# Patient Record
Sex: FEMALE | Race: OTHER | NOT HISPANIC OR LATINO
[De-identification: names, ages, dates, MRNs, and addresses within clinical notes are randomized per-mention and may not be internally consistent; named-entity substitution may affect disease eponyms.]

---

## 2017-05-19 ENCOUNTER — RESULT REVIEW (OUTPATIENT)
Age: 47
End: 2017-05-19

## 2017-07-31 ENCOUNTER — APPOINTMENT (OUTPATIENT)
Dept: COLORECTAL SURGERY | Facility: CLINIC | Age: 47
End: 2017-07-31
Payer: COMMERCIAL

## 2017-07-31 PROCEDURE — 45384 COLONOSCOPY W/LESION REMOVAL: CPT

## 2017-08-02 LAB — CORE LAB BIOPSY: NORMAL

## 2017-11-18 ENCOUNTER — APPOINTMENT (OUTPATIENT)
Dept: HUMAN REPRODUCTION | Facility: CLINIC | Age: 47
End: 2017-11-18

## 2017-12-26 ENCOUNTER — APPOINTMENT (OUTPATIENT)
Dept: INTERNAL MEDICINE | Facility: CLINIC | Age: 47
End: 2017-12-26

## 2017-12-27 ENCOUNTER — APPOINTMENT (OUTPATIENT)
Dept: INTERNAL MEDICINE | Facility: CLINIC | Age: 47
End: 2017-12-27

## 2018-01-18 ENCOUNTER — OTHER (OUTPATIENT)
Age: 48
End: 2018-01-18

## 2018-01-24 ENCOUNTER — APPOINTMENT (OUTPATIENT)
Dept: INTERNAL MEDICINE | Facility: CLINIC | Age: 48
End: 2018-01-24
Payer: COMMERCIAL

## 2018-01-24 ENCOUNTER — APPOINTMENT (OUTPATIENT)
Dept: INTERNAL MEDICINE | Facility: CLINIC | Age: 48
End: 2018-01-24

## 2018-01-24 VITALS
DIASTOLIC BLOOD PRESSURE: 90 MMHG | BODY MASS INDEX: 24.63 KG/M2 | HEART RATE: 82 BPM | SYSTOLIC BLOOD PRESSURE: 140 MMHG | WEIGHT: 139 LBS | OXYGEN SATURATION: 99 % | HEIGHT: 63 IN

## 2018-01-24 DIAGNOSIS — Z87.19 PERSONAL HISTORY OF OTHER DISEASES OF THE DIGESTIVE SYSTEM: ICD-10-CM

## 2018-01-24 DIAGNOSIS — Z87.448 PERSONAL HISTORY OF OTHER DISEASES OF URINARY SYSTEM: ICD-10-CM

## 2018-01-24 PROCEDURE — 99396 PREV VISIT EST AGE 40-64: CPT

## 2018-01-25 LAB
APPEARANCE: CLEAR
BILIRUBIN URINE: NEGATIVE
BLOOD URINE: NEGATIVE
COLOR: YELLOW
GLUCOSE QUALITATIVE U: NEGATIVE MG/DL
KETONES URINE: NEGATIVE
LEUKOCYTE ESTERASE URINE: NEGATIVE
NITRITE URINE: NEGATIVE
PH URINE: 6
PROTEIN URINE: NEGATIVE MG/DL
SPECIFIC GRAVITY URINE: 1.01
UROBILINOGEN URINE: NEGATIVE MG/DL

## 2018-01-31 LAB
ALBUMIN SERPL ELPH-MCNC: 4.4 G/DL
ALP BLD-CCNC: 61 U/L
ALT SERPL-CCNC: 19 U/L
ANION GAP SERPL CALC-SCNC: 16 MMOL/L
AST SERPL-CCNC: 21 U/L
BASOPHILS # BLD AUTO: 0.03 K/UL
BASOPHILS NFR BLD AUTO: 0.3 %
BILIRUB SERPL-MCNC: 0.4 MG/DL
BUN SERPL-MCNC: 11 MG/DL
CALCIUM SERPL-MCNC: 10.1 MG/DL
CHLORIDE SERPL-SCNC: 103 MMOL/L
CHOLEST SERPL-MCNC: 327 MG/DL
CHOLEST/HDLC SERPL: 5.6 RATIO
CO2 SERPL-SCNC: 22 MMOL/L
CREAT SERPL-MCNC: 0.69 MG/DL
EOSINOPHIL # BLD AUTO: 0.2 K/UL
EOSINOPHIL NFR BLD AUTO: 2.3 %
GLUCOSE SERPL-MCNC: 97 MG/DL
HBA1C MFR BLD HPLC: 5.5 %
HCT VFR BLD CALC: 43.7 %
HDLC SERPL-MCNC: 58 MG/DL
HGB BLD-MCNC: 14.4 G/DL
IMM GRANULOCYTES NFR BLD AUTO: 0.3 %
LDLC SERPL CALC-MCNC: 226 MG/DL
LYMPHOCYTES # BLD AUTO: 2.18 K/UL
LYMPHOCYTES NFR BLD AUTO: 25.2 %
MAN DIFF?: NORMAL
MCHC RBC-ENTMCNC: 30.8 PG
MCHC RBC-ENTMCNC: 33 GM/DL
MCV RBC AUTO: 93.4 FL
MONOCYTES # BLD AUTO: 0.76 K/UL
MONOCYTES NFR BLD AUTO: 8.8 %
NEUTROPHILS # BLD AUTO: 5.44 K/UL
NEUTROPHILS NFR BLD AUTO: 63.1 %
PLATELET # BLD AUTO: 391 K/UL
POTASSIUM SERPL-SCNC: 4.6 MMOL/L
PROT SERPL-MCNC: 7.1 G/DL
RBC # BLD: 4.68 M/UL
RBC # FLD: 13.1 %
SODIUM SERPL-SCNC: 141 MMOL/L
TRIGL SERPL-MCNC: 213 MG/DL
TSH SERPL-ACNC: 2.6 UIU/ML
WBC # FLD AUTO: 8.64 K/UL

## 2018-02-05 LAB
CHOLEST SERPL-MCNC: 316 MG/DL
CHOLEST/HDLC SERPL: 5.7 RATIO
HDLC SERPL-MCNC: 55 MG/DL
LDLC SERPL CALC-MCNC: 223 MG/DL
TRIGL SERPL-MCNC: 190 MG/DL

## 2018-07-19 ENCOUNTER — MEDICATION RENEWAL (OUTPATIENT)
Age: 48
End: 2018-07-19

## 2018-07-19 RX ORDER — ATORVASTATIN CALCIUM 20 MG/1
20 TABLET, FILM COATED ORAL
Qty: 30 | Refills: 3 | Status: DISCONTINUED | COMMUNITY
Start: 2018-02-05 | End: 2018-07-19

## 2019-03-28 ENCOUNTER — APPOINTMENT (OUTPATIENT)
Dept: PLASTIC SURGERY | Facility: CLINIC | Age: 49
End: 2019-03-28

## 2019-04-09 ENCOUNTER — APPOINTMENT (OUTPATIENT)
Dept: INTERNAL MEDICINE | Facility: CLINIC | Age: 49
End: 2019-04-09
Payer: COMMERCIAL

## 2019-04-09 VITALS
DIASTOLIC BLOOD PRESSURE: 72 MMHG | OXYGEN SATURATION: 98 % | WEIGHT: 127 LBS | TEMPERATURE: 98 F | BODY MASS INDEX: 22.5 KG/M2 | HEART RATE: 66 BPM | HEIGHT: 63 IN | SYSTOLIC BLOOD PRESSURE: 120 MMHG

## 2019-04-09 DIAGNOSIS — Z87.19 PERSONAL HISTORY OF OTHER DISEASES OF THE DIGESTIVE SYSTEM: ICD-10-CM

## 2019-04-09 DIAGNOSIS — R39.9 UNSPECIFIED SYMPTOMS AND SIGNS INVOLVING THE GENITOURINARY SYSTEM: ICD-10-CM

## 2019-04-09 PROCEDURE — G0442 ANNUAL ALCOHOL SCREEN 15 MIN: CPT

## 2019-04-09 PROCEDURE — 99396 PREV VISIT EST AGE 40-64: CPT

## 2019-04-09 PROCEDURE — G0444 DEPRESSION SCREEN ANNUAL: CPT

## 2019-04-09 RX ORDER — CIPROFLOXACIN HYDROCHLORIDE 500 MG/1
500 TABLET, FILM COATED ORAL
Qty: 6 | Refills: 0 | Status: DISCONTINUED | COMMUNITY
Start: 2018-08-14 | End: 2019-04-09

## 2019-04-09 NOTE — ASSESSMENT
[FreeTextEntry1] : \par 49 year old woman with hx HLD presents for CPE.\par \par 1. HCM: Vaccines - up to date with flu shot; follows with GYN for PAP, mammo; breast exam benign in office today; praised pt's tobacco-free lifestyle; check labs today\par \par 2. HLD: not consistent with statin medication due to side effects; referral given to see Dr. Simon for other options; check lipid panel today, fasting for 6 hrs\par \par Depression screen performed today, PHQ2=0\par \par Annual alcohol misuse screen, 15 mins, done; negative

## 2019-04-09 NOTE — HISTORY OF PRESENT ILLNESS
[Health Maintenance] : health maintenance [___ Year(s) Ago] : [unfilled] year(s) ago [Spouse] : spouse [] :  [Occupation ___] : occupation: [unfilled] [Working Full Time] : working full time [Never Smoked Cigarettes] : has never smoked cigarettes [Occasional Use] : occasional alcohol use [None] : The patient has no concerns about alcohol abuse [Never] : has never used illicit drugs [Reg. Dental Visits] : She has regular dental visits [Good] : good [Healthy Diet] : She consumes a diverse and healthy diet [Regular Exercise] : She exercises regularly [Reviewed and Updated] : risk screening reviewed and updated [Up to Date] : up to date [Vision Problems] : She denies vision problems [Hearing Loss] : She denies hearing loss [Weight Concerns] : She does not have any weight concerns [Tobacco Use] : She does not use tobacco [Alcohol Use] : She denies alcohol use [Drug Abuse] : She denies drug use [de-identified] : \par Not consistently taking statin for elevated LDL. Had bad side effects with myalgias and fatigue.

## 2019-04-09 NOTE — PHYSICAL EXAM
[Sclera] : the sclera and conjunctiva were normal [General Appearance - In No Acute Distress] : in no acute distress [General Appearance - Alert] : alert [PERRL With Normal Accommodation] : pupils were equal in size, round, and reactive to light [Extraocular Movements] : extraocular movements were intact [Outer Ear] : the ears and nose were normal in appearance [Oropharynx] : the oropharynx was normal [Neck Cervical Mass (___cm)] : no neck mass was observed [Neck Appearance] : the appearance of the neck was normal [Thyroid Diffuse Enlargement] : the thyroid was not enlarged [Jugular Venous Distention Increased] : there was no jugular-venous distention [Thyroid Nodule] : there were no palpable thyroid nodules [Auscultation Breath Sounds / Voice Sounds] : lungs were clear to auscultation bilaterally [Heart Sounds Gallop] : no gallops [Heart Rate And Rhythm] : heart rate was normal and rhythm regular [Heart Sounds] : normal S1 and S2 [Heart Sounds Pericardial Friction Rub] : no pericardial rub [Murmurs] : no murmurs [Full Pulse] : the pedal pulses are present [Edema] : there was no peripheral edema [Breast Appearance] : normal in appearance [Breast Palpation Mass] : no palpable masses [Breast Abnormal Lactation (Galactorrhea)] : no nipple discharge [Bowel Sounds] : normal bowel sounds [Abdomen Soft] : soft [Abdomen Mass (___ Cm)] : no abdominal mass palpated [Abdomen Tenderness] : non-tender [Cervical Lymph Nodes Enlarged Anterior Bilaterally] : anterior cervical [Cervical Lymph Nodes Enlarged Posterior Bilaterally] : posterior cervical [Axillary Lymph Nodes Enlarged Bilaterally] : axillary [Supraclavicular Lymph Nodes Enlarged Bilaterally] : supraclavicular [No CVA Tenderness] : no ~M costovertebral angle tenderness [No Spinal Tenderness] : no spinal tenderness [Abnormal Walk] : normal gait [Nail Clubbing] : no clubbing  or cyanosis of the fingernails [Musculoskeletal - Swelling] : no joint swelling seen [Motor Tone] : muscle strength and tone were normal [] : no rash [Sensation] : the sensory exam was normal to light touch and pinprick [Motor Exam] : the motor exam was normal [No Focal Deficits] : no focal deficits [Affect] : the affect was normal [Impaired Insight] : insight and judgment were intact [Oriented To Time, Place, And Person] : oriented to person, place, and time

## 2019-04-11 LAB
ALBUMIN SERPL ELPH-MCNC: 4.6 G/DL
ALP BLD-CCNC: 61 U/L
ALT SERPL-CCNC: 17 U/L
ANION GAP SERPL CALC-SCNC: 11 MMOL/L
AST SERPL-CCNC: 15 U/L
BASOPHILS # BLD AUTO: 0.04 K/UL
BASOPHILS NFR BLD AUTO: 0.4 %
BILIRUB SERPL-MCNC: 0.3 MG/DL
BUN SERPL-MCNC: 16 MG/DL
CALCIUM SERPL-MCNC: 9.9 MG/DL
CHLORIDE SERPL-SCNC: 104 MMOL/L
CHOLEST SERPL-MCNC: 274 MG/DL
CHOLEST/HDLC SERPL: 5 RATIO
CO2 SERPL-SCNC: 25 MMOL/L
CREAT SERPL-MCNC: 0.64 MG/DL
EOSINOPHIL # BLD AUTO: 0.46 K/UL
EOSINOPHIL NFR BLD AUTO: 4.9 %
ESTIMATED AVERAGE GLUCOSE: 120 MG/DL
GLUCOSE SERPL-MCNC: 95 MG/DL
HBA1C MFR BLD HPLC: 5.8 %
HCT VFR BLD CALC: 43.2 %
HDLC SERPL-MCNC: 55 MG/DL
HGB BLD-MCNC: 13.8 G/DL
HIV1+2 AB SPEC QL IA.RAPID: NONREACTIVE
IMM GRANULOCYTES NFR BLD AUTO: 0.3 %
LDLC SERPL CALC-MCNC: 191 MG/DL
LYMPHOCYTES # BLD AUTO: 2.86 K/UL
LYMPHOCYTES NFR BLD AUTO: 30.4 %
MAN DIFF?: NORMAL
MCHC RBC-ENTMCNC: 29.4 PG
MCHC RBC-ENTMCNC: 31.9 GM/DL
MCV RBC AUTO: 91.9 FL
MONOCYTES # BLD AUTO: 0.75 K/UL
MONOCYTES NFR BLD AUTO: 8 %
NEUTROPHILS # BLD AUTO: 5.28 K/UL
NEUTROPHILS NFR BLD AUTO: 56 %
PLATELET # BLD AUTO: 334 K/UL
POTASSIUM SERPL-SCNC: 4.5 MMOL/L
PROT SERPL-MCNC: 6.7 G/DL
RBC # BLD: 4.7 M/UL
RBC # FLD: 12.4 %
SODIUM SERPL-SCNC: 140 MMOL/L
TRIGL SERPL-MCNC: 138 MG/DL
TSH SERPL-ACNC: 1.94 UIU/ML
WBC # FLD AUTO: 9.42 K/UL

## 2019-05-29 ENCOUNTER — APPOINTMENT (OUTPATIENT)
Dept: PLASTIC SURGERY | Facility: CLINIC | Age: 49
End: 2019-05-29
Payer: COMMERCIAL

## 2019-05-29 ENCOUNTER — LABORATORY RESULT (OUTPATIENT)
Age: 49
End: 2019-05-29

## 2019-05-29 VITALS
HEART RATE: 80 BPM | RESPIRATION RATE: 18 BRPM | WEIGHT: 127 LBS | TEMPERATURE: 98.1 F | HEIGHT: 63 IN | BODY MASS INDEX: 22.5 KG/M2 | OXYGEN SATURATION: 96 % | SYSTOLIC BLOOD PRESSURE: 148 MMHG | DIASTOLIC BLOOD PRESSURE: 85 MMHG

## 2019-05-29 PROCEDURE — 99199 UNLISTED SPECIAL SVC PX/RPRT: CPT | Mod: NC

## 2019-05-29 NOTE — HISTORY OF PRESENT ILLNESS
[FreeTextEntry1] : This is a prosper 50 yo F who presents to the office for an initial consultation regarding a left upper arm skin lesion. She had the lesion shave biopsied by her Dermatologist which showed melanocytic nevus. She was recommended to have the lesion fully excised. She is here today to discuss treatment options. \par

## 2019-05-29 NOTE — PROCEDURE
[Nl] : None [FreeTextEntry1] : Left upper arm skin lesion [FreeTextEntry2] : Excisional biopsy left upper arm lesion 2cm, and closure [FreeTextEntry6] : After the area was prepped and draped, the area was infiltrated with 1% lidocaine with epi. The previously biopsied area was marked with a 2mm margin around it. Incision was made around and carried down to subcutaneous fat. The specimen was marked with a short stitch superior and long lateral. The specimen was removed in one piecemeal and sent to pathology. It measured 2cm.\par \par The wound was then closed in layers with 4-0 monocryl for the dermis and 4-0 monocyl subcuticular for the skin. Patient tolerated well the procedure, there were no complications.  [FreeTextEntry7] : Left upper arm skin lesion

## 2019-05-29 NOTE — PHYSICAL EXAM
[de-identified] : The patient is ambulatory, well groomed, with no signs of cachexia. [de-identified] : Normocephalic, atraumatic [de-identified] : No conjuctival erythema, hyperemia, or discharge; No ectropion, entropion, lagophthalmos, or lid malposition\par Both pupils are equal, round, & reactive to light  [de-identified] : There are no masses, scars, or lesions of the external ear.  The external nose is midline with no scars or masses.  \par Gross hearing is intact bilaterally (finger rub).\par The nasal mucosa has no edema, lesions, or signs of desiccation. \par The lips and gums have no masses, lesions, friability or edema.

## 2019-05-29 NOTE — DATA REVIEWED
[FreeTextEntry1] : 04/11/19 Left Tricep:\par Melanocytic nevus, junctional type... the melanocytic proliferation is somewhat asymmetric and I would suggest that the skin be re-excised in order to be certain that the entire lesion has been removed and to prevent recurrence.\par

## 2019-08-22 ENCOUNTER — TRANSCRIPTION ENCOUNTER (OUTPATIENT)
Age: 49
End: 2019-08-22

## 2020-05-04 RX ORDER — HYDROXYCHLOROQUINE SULFATE 200 MG/1
200 TABLET, FILM COATED ORAL
Qty: 20 | Refills: 0 | Status: COMPLETED | COMMUNITY
Start: 2020-03-16 | End: 2020-05-14

## 2020-05-04 RX ORDER — AZITHROMYCIN 250 MG/1
250 TABLET, FILM COATED ORAL
Qty: 1 | Refills: 0 | Status: COMPLETED | COMMUNITY
Start: 2020-05-04 | End: 2020-05-09

## 2020-08-27 ENCOUNTER — APPOINTMENT (OUTPATIENT)
Dept: INTERNAL MEDICINE | Facility: CLINIC | Age: 50
End: 2020-08-27
Payer: COMMERCIAL

## 2020-08-27 VITALS
DIASTOLIC BLOOD PRESSURE: 89 MMHG | HEIGHT: 63 IN | WEIGHT: 130 LBS | TEMPERATURE: 98.6 F | HEART RATE: 77 BPM | SYSTOLIC BLOOD PRESSURE: 151 MMHG | BODY MASS INDEX: 23.04 KG/M2 | OXYGEN SATURATION: 98 %

## 2020-08-27 PROCEDURE — 99396 PREV VISIT EST AGE 40-64: CPT

## 2020-08-27 NOTE — ASSESSMENT
[FreeTextEntry1] : \par 50 year old woman with hx HLD presents for CPE.\par \par 1. HCM: Vaccines - up to date with flu shot; follows with GYN for PAP, mammo; breast exam benign in office today; praised pt's tobacco-free lifestyle; check labs today\par \par 2. HLD: not consistent with statin medication due to side effects; referral given to see Dr. Simon for other options; check lipid panel today, fasting for 6 hrs\par \par Depression screen performed today, PHQ2=0\par \par Annual alcohol misuse screen, 15 mins, done; negative

## 2020-08-27 NOTE — PHYSICAL EXAM
[General Appearance - Alert] : alert [General Appearance - In No Acute Distress] : in no acute distress [Sclera] : the sclera and conjunctiva were normal [Extraocular Movements] : extraocular movements were intact [PERRL With Normal Accommodation] : pupils were equal in size, round, and reactive to light [Outer Ear] : the ears and nose were normal in appearance [Neck Appearance] : the appearance of the neck was normal [Oropharynx] : the oropharynx was normal [Neck Cervical Mass (___cm)] : no neck mass was observed [Jugular Venous Distention Increased] : there was no jugular-venous distention [Thyroid Diffuse Enlargement] : the thyroid was not enlarged [Auscultation Breath Sounds / Voice Sounds] : lungs were clear to auscultation bilaterally [Thyroid Nodule] : there were no palpable thyroid nodules [Heart Rate And Rhythm] : heart rate was normal and rhythm regular [Heart Sounds Gallop] : no gallops [Murmurs] : no murmurs [Heart Sounds] : normal S1 and S2 [Heart Sounds Pericardial Friction Rub] : no pericardial rub [Full Pulse] : the pedal pulses are present [Edema] : there was no peripheral edema [Breast Palpation Mass] : no palpable masses [Breast Appearance] : normal in appearance [Breast Abnormal Lactation (Galactorrhea)] : no nipple discharge [Abdomen Soft] : soft [Bowel Sounds] : normal bowel sounds [Abdomen Tenderness] : non-tender [Abdomen Mass (___ Cm)] : no abdominal mass palpated [Cervical Lymph Nodes Enlarged Anterior Bilaterally] : anterior cervical [Supraclavicular Lymph Nodes Enlarged Bilaterally] : supraclavicular [Cervical Lymph Nodes Enlarged Posterior Bilaterally] : posterior cervical [No Spinal Tenderness] : no spinal tenderness [Axillary Lymph Nodes Enlarged Bilaterally] : axillary [No CVA Tenderness] : no ~M costovertebral angle tenderness [Abnormal Walk] : normal gait [Nail Clubbing] : no clubbing  or cyanosis of the fingernails [] : no rash [Musculoskeletal - Swelling] : no joint swelling seen [Motor Tone] : muscle strength and tone were normal [Sensation] : the sensory exam was normal to light touch and pinprick [Motor Exam] : the motor exam was normal [No Focal Deficits] : no focal deficits [Oriented To Time, Place, And Person] : oriented to person, place, and time [Affect] : the affect was normal [Impaired Insight] : insight and judgment were intact

## 2020-08-27 NOTE — HISTORY OF PRESENT ILLNESS
[___ Year(s) Ago] : [unfilled] year(s) ago [Health Maintenance] : health maintenance [] :  [Spouse] : spouse [Working Full Time] : working full time [Occupation ___] : occupation: [unfilled] [Never Smoked Cigarettes] : has never smoked cigarettes [Occasional Use] : occasional alcohol use [Good] : good [Never] : has never used illicit drugs [None] : The patient has no concerns about alcohol abuse [Reg. Dental Visits] : She has regular dental visits [Vision Problems] : She denies vision problems [Healthy Diet] : She consumes a diverse and healthy diet [Hearing Loss] : She denies hearing loss [Regular Exercise] : She exercises regularly [Weight Concerns] : She does not have any weight concerns [Tobacco Use] : She does not use tobacco [Alcohol Use] : She denies alcohol use [Drug Abuse] : She denies drug use [Up to Date] : up to date [Reviewed and Updated] : risk screening reviewed and updated [de-identified] : \par Not consistently taking statin for elevated LDL. Had bad side effects with myalgias and fatigue.

## 2020-09-04 LAB
25(OH)D3 SERPL-MCNC: 46.3 NG/ML
ALBUMIN SERPL ELPH-MCNC: 4.7 G/DL
ALP BLD-CCNC: 67 U/L
ALT SERPL-CCNC: 13 U/L
ANION GAP SERPL CALC-SCNC: 14 MMOL/L
AST SERPL-CCNC: 16 U/L
BASOPHILS # BLD AUTO: 0.04 K/UL
BASOPHILS NFR BLD AUTO: 0.5 %
BILIRUB SERPL-MCNC: 0.3 MG/DL
BUN SERPL-MCNC: 13 MG/DL
CALCIUM SERPL-MCNC: 9.6 MG/DL
CHLORIDE SERPL-SCNC: 105 MMOL/L
CHOLEST SERPL-MCNC: 271 MG/DL
CHOLEST/HDLC SERPL: 5 RATIO
CO2 SERPL-SCNC: 21 MMOL/L
CREAT SERPL-MCNC: 0.64 MG/DL
EOSINOPHIL # BLD AUTO: 0.24 K/UL
EOSINOPHIL NFR BLD AUTO: 2.9 %
ESTIMATED AVERAGE GLUCOSE: 114 MG/DL
GLUCOSE SERPL-MCNC: 82 MG/DL
HBA1C MFR BLD HPLC: 5.6 %
HCT VFR BLD CALC: 42.6 %
HDLC SERPL-MCNC: 54 MG/DL
HGB BLD-MCNC: 13.4 G/DL
HIV1+2 AB SPEC QL IA.RAPID: NONREACTIVE
IMM GRANULOCYTES NFR BLD AUTO: 0.4 %
LDLC SERPL CALC-MCNC: 173 MG/DL
LYMPHOCYTES # BLD AUTO: 2.42 K/UL
LYMPHOCYTES NFR BLD AUTO: 29.4 %
MAN DIFF?: NORMAL
MCHC RBC-ENTMCNC: 29.2 PG
MCHC RBC-ENTMCNC: 31.5 GM/DL
MCV RBC AUTO: 92.8 FL
MONOCYTES # BLD AUTO: 0.73 K/UL
MONOCYTES NFR BLD AUTO: 8.9 %
NEUTROPHILS # BLD AUTO: 4.78 K/UL
NEUTROPHILS NFR BLD AUTO: 57.9 %
PLATELET # BLD AUTO: 313 K/UL
POTASSIUM SERPL-SCNC: 4.3 MMOL/L
PROT SERPL-MCNC: 6.7 G/DL
RBC # BLD: 4.59 M/UL
RBC # FLD: 12.7 %
SARS-COV-2 IGG SERPL IA-ACNC: <0.1 INDEX
SARS-COV-2 IGG SERPL QL IA: NEGATIVE
SODIUM SERPL-SCNC: 140 MMOL/L
TRIGL SERPL-MCNC: 220 MG/DL
TSH SERPL-ACNC: 1.93 UIU/ML
WBC # FLD AUTO: 8.24 K/UL

## 2021-08-31 ENCOUNTER — APPOINTMENT (OUTPATIENT)
Dept: INTERNAL MEDICINE | Facility: CLINIC | Age: 51
End: 2021-08-31

## 2021-09-22 ENCOUNTER — APPOINTMENT (OUTPATIENT)
Dept: INTERNAL MEDICINE | Facility: CLINIC | Age: 51
End: 2021-09-22
Payer: COMMERCIAL

## 2021-09-22 ENCOUNTER — LABORATORY RESULT (OUTPATIENT)
Age: 51
End: 2021-09-22

## 2021-09-22 VITALS
SYSTOLIC BLOOD PRESSURE: 136 MMHG | TEMPERATURE: 97.4 F | HEIGHT: 62 IN | WEIGHT: 130 LBS | BODY MASS INDEX: 23.92 KG/M2 | OXYGEN SATURATION: 98 % | DIASTOLIC BLOOD PRESSURE: 81 MMHG | HEART RATE: 76 BPM

## 2021-09-22 PROCEDURE — 99396 PREV VISIT EST AGE 40-64: CPT

## 2021-09-22 PROCEDURE — 99072 ADDL SUPL MATRL&STAF TM PHE: CPT

## 2021-09-22 NOTE — PHYSICAL EXAM
[General Appearance - Alert] : alert [General Appearance - In No Acute Distress] : in no acute distress [Sclera] : the sclera and conjunctiva were normal [PERRL With Normal Accommodation] : pupils were equal in size, round, and reactive to light [Extraocular Movements] : extraocular movements were intact [Outer Ear] : the ears and nose were normal in appearance [Oropharynx] : the oropharynx was normal [Neck Appearance] : the appearance of the neck was normal [Neck Cervical Mass (___cm)] : no neck mass was observed [Jugular Venous Distention Increased] : there was no jugular-venous distention [Thyroid Diffuse Enlargement] : the thyroid was not enlarged [Thyroid Nodule] : there were no palpable thyroid nodules [Auscultation Breath Sounds / Voice Sounds] : lungs were clear to auscultation bilaterally [Heart Rate And Rhythm] : heart rate was normal and rhythm regular [Heart Sounds] : normal S1 and S2 [Heart Sounds Gallop] : no gallops [Murmurs] : no murmurs [Heart Sounds Pericardial Friction Rub] : no pericardial rub [Full Pulse] : the pedal pulses are present [Edema] : there was no peripheral edema [Breast Appearance] : normal in appearance [Breast Palpation Mass] : no palpable masses [Breast Abnormal Lactation (Galactorrhea)] : no nipple discharge [Bowel Sounds] : normal bowel sounds [Abdomen Soft] : soft [Abdomen Tenderness] : non-tender [Abdomen Mass (___ Cm)] : no abdominal mass palpated [Cervical Lymph Nodes Enlarged Anterior Bilaterally] : anterior cervical [Cervical Lymph Nodes Enlarged Posterior Bilaterally] : posterior cervical [Supraclavicular Lymph Nodes Enlarged Bilaterally] : supraclavicular [No CVA Tenderness] : no ~M costovertebral angle tenderness [Axillary Lymph Nodes Enlarged Bilaterally] : axillary [No Spinal Tenderness] : no spinal tenderness [Abnormal Walk] : normal gait [Nail Clubbing] : no clubbing  or cyanosis of the fingernails [Musculoskeletal - Swelling] : no joint swelling seen [Motor Tone] : muscle strength and tone were normal [] : no rash [Sensation] : the sensory exam was normal to light touch and pinprick [Motor Exam] : the motor exam was normal [No Focal Deficits] : no focal deficits [Oriented To Time, Place, And Person] : oriented to person, place, and time [Impaired Insight] : insight and judgment were intact [Affect] : the affect was normal

## 2021-09-22 NOTE — ASSESSMENT
[FreeTextEntry1] : \par 51 year old woman with hx HLD presents for CPE.\par \par 1. HCM: Vaccines - up to date with flu shot; follows with GYN for PAP, mammo; breast exam benign in office today; praised pt's tobacco-free lifestyle; check labs today\par \par Depression screen performed today, PHQ2=0\par \par Annual alcohol misuse screen, 15 mins, done; negative

## 2021-09-22 NOTE — HISTORY OF PRESENT ILLNESS
[Health Maintenance] : health maintenance [___ Year(s) Ago] : [unfilled] year(s) ago [Spouse] : spouse [] :  [Working Full Time] : working full time [Occupation ___] : occupation: [unfilled] [Never Smoked Cigarettes] : has never smoked cigarettes [Occasional Use] : occasional alcohol use [None] : The patient has no concerns about alcohol abuse [Never] : has never used illicit drugs [Good] : good [Reg. Dental Visits] : She has regular dental visits [Vision Problems] : She denies vision problems [Hearing Loss] : She denies hearing loss [Healthy Diet] : She consumes a diverse and healthy diet [Weight Concerns] : She does not have any weight concerns [Regular Exercise] : She exercises regularly [Tobacco Use] : She does not use tobacco [Alcohol Use] : She denies alcohol use [Drug Abuse] : She denies drug use [Reviewed and Updated] : risk screening reviewed and updated [Up to Date] : up to date [de-identified] : \par Not consistently taking statin for elevated LDL. Had bad side effects with myalgias and fatigue.

## 2021-09-27 ENCOUNTER — APPOINTMENT (OUTPATIENT)
Dept: COLORECTAL SURGERY | Facility: CLINIC | Age: 51
End: 2021-09-27
Payer: COMMERCIAL

## 2021-09-27 ENCOUNTER — LABORATORY RESULT (OUTPATIENT)
Age: 51
End: 2021-09-27

## 2021-09-27 DIAGNOSIS — K63.5 POLYP OF COLON: ICD-10-CM

## 2021-09-27 LAB
25(OH)D3 SERPL-MCNC: 38.8 NG/ML
ALBUMIN SERPL ELPH-MCNC: 4.6 G/DL
ALP BLD-CCNC: 68 U/L
ALT SERPL-CCNC: 17 U/L
ANION GAP SERPL CALC-SCNC: 14 MMOL/L
AST SERPL-CCNC: 16 U/L
B BURGDOR IGG+IGM SER QL IB: NORMAL
BASOPHILS # BLD AUTO: 0.04 K/UL
BASOPHILS NFR BLD AUTO: 0.5 %
BILIRUB SERPL-MCNC: 0.4 MG/DL
BUN SERPL-MCNC: 17 MG/DL
CALCIUM SERPL-MCNC: 10 MG/DL
CHLORIDE SERPL-SCNC: 102 MMOL/L
CHOLEST SERPL-MCNC: 283 MG/DL
CO2 SERPL-SCNC: 24 MMOL/L
COVID-19 NUCLEOCAPSID  GAM ANTIBODY INTERPRETATION: NEGATIVE
COVID-19 SPIKE DOMAIN ANTIBODY INTERPRETATION: POSITIVE
CREAT SERPL-MCNC: 0.64 MG/DL
EOSINOPHIL # BLD AUTO: 0.16 K/UL
EOSINOPHIL NFR BLD AUTO: 2 %
ESTIMATED AVERAGE GLUCOSE: 117 MG/DL
GLUCOSE SERPL-MCNC: 95 MG/DL
HBA1C MFR BLD HPLC: 5.7 %
HCT VFR BLD CALC: 44 %
HDLC SERPL-MCNC: 65 MG/DL
HGB BLD-MCNC: 14 G/DL
HIV1+2 AB SPEC QL IA.RAPID: NONREACTIVE
IMM GRANULOCYTES NFR BLD AUTO: 0.4 %
LDLC SERPL CALC-MCNC: 197 MG/DL
LYMPHOCYTES # BLD AUTO: 2.86 K/UL
LYMPHOCYTES NFR BLD AUTO: 36.2 %
MAN DIFF?: NORMAL
MCHC RBC-ENTMCNC: 29.4 PG
MCHC RBC-ENTMCNC: 31.8 GM/DL
MCV RBC AUTO: 92.4 FL
MONOCYTES # BLD AUTO: 0.55 K/UL
MONOCYTES NFR BLD AUTO: 7 %
NEUTROPHILS # BLD AUTO: 4.25 K/UL
NEUTROPHILS NFR BLD AUTO: 53.9 %
NONHDLC SERPL-MCNC: 219 MG/DL
PLATELET # BLD AUTO: 316 K/UL
POTASSIUM SERPL-SCNC: 4.6 MMOL/L
PROT SERPL-MCNC: 6.9 G/DL
RBC # BLD: 4.76 M/UL
RBC # FLD: 13.1 %
SARS-COV-2 AB SERPL IA-ACNC: >250 U/ML
SARS-COV-2 AB SERPL QL IA: 0.08 INDEX
SODIUM SERPL-SCNC: 140 MMOL/L
TRIGL SERPL-MCNC: 106 MG/DL
TSH SERPL-ACNC: 1.92 UIU/ML
WBC # FLD AUTO: 7.89 K/UL

## 2021-09-27 PROCEDURE — 45384 COLONOSCOPY W/LESION REMOVAL: CPT

## 2021-09-27 PROCEDURE — 99072 ADDL SUPL MATRL&STAF TM PHE: CPT

## 2021-11-29 ENCOUNTER — RX RENEWAL (OUTPATIENT)
Age: 51
End: 2021-11-29

## 2022-09-28 ENCOUNTER — LABORATORY RESULT (OUTPATIENT)
Age: 52
End: 2022-09-28

## 2022-09-28 ENCOUNTER — APPOINTMENT (OUTPATIENT)
Dept: INTERNAL MEDICINE | Facility: CLINIC | Age: 52
End: 2022-09-28

## 2022-09-28 ENCOUNTER — NON-APPOINTMENT (OUTPATIENT)
Age: 52
End: 2022-09-28

## 2022-09-28 VITALS
TEMPERATURE: 98.3 F | SYSTOLIC BLOOD PRESSURE: 135 MMHG | BODY MASS INDEX: 24.04 KG/M2 | OXYGEN SATURATION: 98 % | HEIGHT: 62.6 IN | DIASTOLIC BLOOD PRESSURE: 77 MMHG | WEIGHT: 134 LBS | HEART RATE: 68 BPM

## 2022-09-28 DIAGNOSIS — Z00.00 ENCOUNTER FOR GENERAL ADULT MEDICAL EXAMINATION W/OUT ABNORMAL FINDINGS: ICD-10-CM

## 2022-09-28 DIAGNOSIS — Z13.39 ENCOUNTER FOR SCREENING EXAM FOR OTHER MENTAL HEALTH AND BEHAVIORAL DISORDERS: ICD-10-CM

## 2022-09-28 DIAGNOSIS — Z13.31 ENCOUNTER FOR SCREENING FOR DEPRESSION: ICD-10-CM

## 2022-09-28 PROCEDURE — 99396 PREV VISIT EST AGE 40-64: CPT

## 2022-09-28 PROCEDURE — 99072 ADDL SUPL MATRL&STAF TM PHE: CPT

## 2022-09-28 PROCEDURE — G0444 DEPRESSION SCREEN ANNUAL: CPT | Mod: 59

## 2022-09-28 PROCEDURE — G0442 ANNUAL ALCOHOL SCREEN 15 MIN: CPT

## 2022-09-28 NOTE — HEALTH RISK ASSESSMENT
[No] : No [1 or 2 (0 pts)] : 1 or 2 (0 points) [Never (0 pts)] : Never (0 points) [0] : 2) Feeling down, depressed, or hopeless: Not at all (0) [PHQ-2 Negative - No further assessment needed] : PHQ-2 Negative - No further assessment needed [Audit-CScore] : 0 [[id number]] : 0

## 2022-09-28 NOTE — ASSESSMENT
[FreeTextEntry1] : \par 52 year old woman with hx HLD presents for CPE.\par \par 1. HCM: Vaccines - up to date with flu shot; follows with GYN for PAP, mammo; breast exam benign in office today; praised pt's tobacco-free lifestyle; check labs today\par \par Depression screen performed today, PHQ2=0\par \par Annual alcohol misuse screen, 15 mins, done; negative

## 2022-09-28 NOTE — PHYSICAL EXAM
[General Appearance - Alert] : alert [General Appearance - In No Acute Distress] : in no acute distress [Sclera] : the sclera and conjunctiva were normal [PERRL With Normal Accommodation] : pupils were equal in size, round, and reactive to light [Extraocular Movements] : extraocular movements were intact [Outer Ear] : the ears and nose were normal in appearance [Oropharynx] : the oropharynx was normal [Neck Appearance] : the appearance of the neck was normal [Neck Cervical Mass (___cm)] : no neck mass was observed [Jugular Venous Distention Increased] : there was no jugular-venous distention [Thyroid Diffuse Enlargement] : the thyroid was not enlarged [Thyroid Nodule] : there were no palpable thyroid nodules [Auscultation Breath Sounds / Voice Sounds] : lungs were clear to auscultation bilaterally [Heart Rate And Rhythm] : heart rate was normal and rhythm regular [Heart Sounds] : normal S1 and S2 [Heart Sounds Gallop] : no gallops [Murmurs] : no murmurs [Heart Sounds Pericardial Friction Rub] : no pericardial rub [Full Pulse] : the pedal pulses are present [Edema] : there was no peripheral edema [Breast Appearance] : normal in appearance [Breast Palpation Mass] : no palpable masses [Breast Abnormal Lactation (Galactorrhea)] : no nipple discharge [Bowel Sounds] : normal bowel sounds [Abdomen Soft] : soft [Abdomen Tenderness] : non-tender [Abdomen Mass (___ Cm)] : no abdominal mass palpated [Cervical Lymph Nodes Enlarged Posterior Bilaterally] : posterior cervical [Cervical Lymph Nodes Enlarged Anterior Bilaterally] : anterior cervical [Supraclavicular Lymph Nodes Enlarged Bilaterally] : supraclavicular [Axillary Lymph Nodes Enlarged Bilaterally] : axillary [No CVA Tenderness] : no ~M costovertebral angle tenderness [No Spinal Tenderness] : no spinal tenderness [Abnormal Walk] : normal gait [Nail Clubbing] : no clubbing  or cyanosis of the fingernails [Musculoskeletal - Swelling] : no joint swelling seen [Motor Tone] : muscle strength and tone were normal [] : no rash [Sensation] : the sensory exam was normal to light touch and pinprick [Motor Exam] : the motor exam was normal [No Focal Deficits] : no focal deficits [Oriented To Time, Place, And Person] : oriented to person, place, and time [Impaired Insight] : insight and judgment were intact [Affect] : the affect was normal

## 2022-09-28 NOTE — HISTORY OF PRESENT ILLNESS
[Health Maintenance] : health maintenance [___ Year(s) Ago] : [unfilled] year(s) ago [Spouse] : spouse [] :  [Working Full Time] : working full time [Occupation ___] : occupation: [unfilled] [Never Smoked Cigarettes] : has never smoked cigarettes [Occasional Use] : occasional alcohol use [None] : The patient has no concerns about alcohol abuse [Never] : has never used illicit drugs [Good] : good [Reg. Dental Visits] : She has regular dental visits [Vision Problems] : She denies vision problems [Hearing Loss] : She denies hearing loss [Healthy Diet] : She consumes a diverse and healthy diet [Weight Concerns] : She does not have any weight concerns [Regular Exercise] : She exercises regularly [Tobacco Use] : She does not use tobacco [Alcohol Use] : She denies alcohol use [Drug Abuse] : She denies drug use [Reviewed and Updated] : risk screening reviewed and updated [Up to Date] : up to date [de-identified] : \par Not consistently taking statin for elevated LDL. Had bad side effects with myalgias and fatigue.

## 2022-09-30 LAB
ALBUMIN SERPL ELPH-MCNC: 4.8 G/DL
ALP BLD-CCNC: 68 U/L
ALT SERPL-CCNC: 13 U/L
ANION GAP SERPL CALC-SCNC: 15 MMOL/L
AST SERPL-CCNC: 13 U/L
BASOPHILS # BLD AUTO: 0.04 K/UL
BASOPHILS NFR BLD AUTO: 0.4 %
BILIRUB SERPL-MCNC: 0.4 MG/DL
BUN SERPL-MCNC: 19 MG/DL
CALCIUM SERPL-MCNC: 10 MG/DL
CHLORIDE SERPL-SCNC: 102 MMOL/L
CHOLEST SERPL-MCNC: 286 MG/DL
CO2 SERPL-SCNC: 24 MMOL/L
CREAT SERPL-MCNC: 0.67 MG/DL
EGFR: 104 ML/MIN/1.73M2
EOSINOPHIL # BLD AUTO: 0.19 K/UL
EOSINOPHIL NFR BLD AUTO: 2 %
ESTIMATED AVERAGE GLUCOSE: 114 MG/DL
GLUCOSE SERPL-MCNC: 94 MG/DL
HBA1C MFR BLD HPLC: 5.6 %
HCT VFR BLD CALC: 43.7 %
HDLC SERPL-MCNC: 60 MG/DL
HGB BLD-MCNC: 13.9 G/DL
HIV1+2 AB SPEC QL IA.RAPID: NONREACTIVE
IMM GRANULOCYTES NFR BLD AUTO: 0.3 %
LDLC SERPL CALC-MCNC: 202 MG/DL
LYMPHOCYTES # BLD AUTO: 3 K/UL
LYMPHOCYTES NFR BLD AUTO: 31.7 %
MAN DIFF?: NORMAL
MCHC RBC-ENTMCNC: 29.8 PG
MCHC RBC-ENTMCNC: 31.8 GM/DL
MCV RBC AUTO: 93.6 FL
MONOCYTES # BLD AUTO: 0.76 K/UL
MONOCYTES NFR BLD AUTO: 8 %
NEUTROPHILS # BLD AUTO: 5.43 K/UL
NEUTROPHILS NFR BLD AUTO: 57.6 %
NONHDLC SERPL-MCNC: 226 MG/DL
PLATELET # BLD AUTO: 343 K/UL
POTASSIUM SERPL-SCNC: 4.3 MMOL/L
PROT SERPL-MCNC: 6.9 G/DL
RBC # BLD: 4.67 M/UL
RBC # FLD: 12.5 %
SODIUM SERPL-SCNC: 140 MMOL/L
TRIGL SERPL-MCNC: 122 MG/DL
TSH SERPL-ACNC: 1.99 UIU/ML
WBC # FLD AUTO: 9.45 K/UL

## 2022-11-29 DIAGNOSIS — L81.9 DISORDER OF PIGMENTATION, UNSPECIFIED: ICD-10-CM

## 2022-12-02 ENCOUNTER — LABORATORY RESULT (OUTPATIENT)
Age: 52
End: 2022-12-02

## 2022-12-02 ENCOUNTER — APPOINTMENT (OUTPATIENT)
Dept: CARDIOLOGY | Facility: CLINIC | Age: 52
End: 2022-12-02

## 2022-12-02 VITALS
HEIGHT: 62 IN | TEMPERATURE: 98.4 F | RESPIRATION RATE: 16 BRPM | DIASTOLIC BLOOD PRESSURE: 80 MMHG | SYSTOLIC BLOOD PRESSURE: 122 MMHG | OXYGEN SATURATION: 97 % | HEART RATE: 80 BPM | BODY MASS INDEX: 25.21 KG/M2 | WEIGHT: 137 LBS

## 2022-12-02 PROCEDURE — 99204 OFFICE O/P NEW MOD 45 MIN: CPT

## 2022-12-06 ENCOUNTER — APPOINTMENT (OUTPATIENT)
Dept: CARDIOLOGY | Facility: CLINIC | Age: 52
End: 2022-12-06

## 2022-12-06 VITALS
SYSTOLIC BLOOD PRESSURE: 130 MMHG | DIASTOLIC BLOOD PRESSURE: 82 MMHG | TEMPERATURE: 98 F | BODY MASS INDEX: 25.21 KG/M2 | HEART RATE: 70 BPM | RESPIRATION RATE: 16 BRPM | OXYGEN SATURATION: 99 % | HEIGHT: 62 IN | WEIGHT: 137 LBS

## 2022-12-06 DIAGNOSIS — R06.00 DYSPNEA, UNSPECIFIED: ICD-10-CM

## 2022-12-06 DIAGNOSIS — R07.9 CHEST PAIN, UNSPECIFIED: ICD-10-CM

## 2022-12-06 PROCEDURE — 99213 OFFICE O/P EST LOW 20 MIN: CPT | Mod: 25

## 2022-12-06 PROCEDURE — 93306 TTE W/DOPPLER COMPLETE: CPT

## 2022-12-06 PROCEDURE — 93015 CV STRESS TEST SUPVJ I&R: CPT

## 2022-12-06 NOTE — REASON FOR VISIT
[CV Risk Factors and Non-Cardiac Disease] : CV risk factors and non-cardiac disease [Hyperlipidemia] : hyperlipidemia [FreeTextEntry1] : This is a 52 year old female patient with past medical history of hypercholesterolemia and status- post Covid infection in January 2021 presents today for lipid consultation. Patient states she is referred by her Internist Dr. Arroyo. She states she has had hypercholesterolemia for many years. Significant family history includes her mom suffering from hypertension and hypercholesterolemia, her father suffered from hypertension, aortic valve replacement, and hypercholesterolemia, and her 17- year old son also suffers from hypercholesterolemia. Patient states that she has taken Lipitor 10mg and Crestor 10mg in the past with unbearable side effects of muscle pain and weakness. She was rechallenged with the statin medications and still had those side effects. She also states that she tried Ezetimibe 10 mg and she woke up the following day with her eyes swollen and feeling congested. Patient denies ever smoking and does not drink alcohol. She states she has about half a cup of coffee in the mornings. Past surgeries include C- section in 2005 and myomectomy in 2003. Blood work on 9/28/22 demonstrates A1C 5.6, triglyceride 122, cholesterol 286, HDL 60, LDL (calc) 202. Patient denies chest pain, heart palpitation, SOB. Patient is a full-t time anesthesiologist.

## 2022-12-06 NOTE — REASON FOR VISIT
[CV Risk Factors and Non-Cardiac Disease] : CV risk factors and non-cardiac disease [Hyperlipidemia] : hyperlipidemia [FreeTextEntry1] : This is a  52-year-old female, with past medical history significant for Familial hypercholesterolemia, statin intolerance, status post COVID-19 infection in January 2021, prediabetic is here for lipid/cardiac follow-up evaluation.\par She denies chest pain, shortness of breath, dizziness or syncope. Patient states she feels generally well today. Blood work on 12/2/22 demonstrates glucose 123, total cholesterol 284, triglycerides 306, LDL (calc) 164, HDL 59, hGB A1C 5.9. \par Exercise stress test done today is normal \par \par \par PMHx: \par This is a 52 year old female patient with past medical history of hypercholesterolemia and status- post Covid infection in January 2021 presents today for lipid consultation. Patient states she is referred by her Internist Dr. Arroyo. She states she has had hypercholesterolemia for many years. Significant family history includes her mom suffering from hypertension and hypercholesterolemia, her father suffered from hypertension, aortic valve replacement, and hypercholesterolemia, and her 17- year old son also suffers from hypercholesterolemia. Patient states that she has taken Lipitor 10mg and Crestor 10mg in the past with unbearable side effects of muscle pain and weakness. She was rechallenged with the statin medications and still had those side effects. She also states that she tried Ezetimibe 10 mg and she woke up the following day with her eyes swollen and feeling congested. Patient denies ever smoking and does not drink alcohol. She states she has about half a cup of coffee in the mornings. Past surgeries include C- section in 2005 and myomectomy in 2003. Blood work on 9/28/22 demonstrates A1C 5.6, triglyceride 122, cholesterol 286, HDL 60, LDL (calc) 202. Patient denies chest pain, heart palpitation, SOB. Patient is a full-t time anesthesiologist.

## 2022-12-06 NOTE — DISCUSSION/SUMMARY
[FreeTextEntry1] : This is a  52-year-old female, with past medical history significant for hypercholesterolemia, statin intolerance, status post COVID-19 infection in January 2021, lipid/cardiac follow-up evaluation.\par She denies chest pain, shortness of breath, dizziness or syncope.  She was born in [facility] and has no history of rheumatic fever.  She does not drink excessive caffeine or alcohol.\par Her cardiac risk factors include familial hypercholesterolemia (both her mother and father had a very high cholesterol, and her son has hypercholesterolemia).\par The patient had normal exercise stress test December 6, 2022.\par Blood work done December 2, 2022 demonstrated a cholesterol 284, HDL of 59, triglycerides of 306, (nonfasting), non-HDL cholesterol 225, direct LDL of 184 mg/dL with hemoglobin A1c of 5.9.\par The patient will require preauthorization before starting on Praluent 150 mg subcutaneously every 2 weeks.\par She understands she must go for blood work 1 week after the second dose of Praluent.\par She cannot take Zetia therapy because of developing swollen eyes and face after taking 1 dose of the medication\par The patient developed severe muscle aches on atorvastatin and rosuvastatin which were persistent and incapacitating.  She rechallenged herself on these medications and was unable to tolerate them secondary to myalgias.  She also was unable to tolerate simvastatin and pravastatin by her report due to similar muscle symptoms.\par She was started on Zetia 10 mg daily and developed eye swelling and upper nasal congestion and stopped this medication.\par Blood work done September 28, 2022 demonstrated hemoglobin A1c of 5.6, cholesterol 286, triglycerides 122, HDL 60, LDL calculated 202 mg/dL and non-HDL cholesterol 226 mg/dL.\par These findings are consistent with heterozygous familial hypercholesterolemia.\par The patient is clearly statin intolerant and requires PCSK9 injectable therapy to achieve an LDL cholesterol of less than 100 mg/dL.  She will start on Praluent 150 mg subcutaneously every 2 weeks, (preferred by insurance), and will have blood work 1 week after the second shot is completed.\par \par Lifestyle modification was also discussed, and I would recommend that she work with our registered dietitian after the holiday season.\par The patient understands that aerobic exercises must be increased to 40 minutes 4 times per week. A detailed discussion of lifestyle modification was done today. The patient has a good understanding of the diagnosis, and treatment plan. Lifestyle modification was also outlined.\par She is instructed to follow-up with her primary care physician.  She will follow-up with me after above-noted diagnostic tests are completed\par Patient is an anesthesiologist. \par \par Thank you for allowing to participate in the care of your patient.  Please do not hesitate to call if you have any further questions.

## 2022-12-06 NOTE — DISCUSSION/SUMMARY
[FreeTextEntry1] : This is a 52-year-old female, with past medical history significant for hypercholesterolemia, statin intolerance, status post COVID-19 infection in January 2021, lipid/cardiac consultation.\par She denies chest pain, shortness of breath, dizziness or syncope.  She was born in [facility] and has no history of rheumatic fever.  She does not drink excessive caffeine or alcohol.\par Her cardiac risk factors include familial hypercholesterolemia (both her mother and father had a very high cholesterol, and her son has hypercholesterolemia).\par The patient developed severe muscle aches on atorvastatin and rosuvastatin which were persistent and incapacitating.  She rechallenged herself on these medications and was unable to tolerate them secondary to myalgias.  She also was unable to tolerate simvastatin and pravastatin by her report due to similar muscle symptoms.\par She was started on Zetia 10 mg daily and developed eye swelling and upper nasal congestion and stopped this medication.\par Blood work done September 28, 2022 demonstrated hemoglobin A1c of 5.6, cholesterol 286, triglycerides 122, HDL 60, LDL calculated 202 mg/dL and non-HDL cholesterol 226 mg/dL.\par These findings are consistent with heterozygous familial hypercholesterolemia.\par The patient is clearly statin intolerant and requires PCSK9 injectable therapy to achieve an LDL cholesterol of less than 100 mg/dL.  She will start on Praluent 150 mg subcutaneously every 2 weeks, (preferred by insurance), and will have blood work 1 week after the second shot is completed.\par The patient will schedule exercise stress test to rule out significant coronary artery disease.\par She will also schedule echo Doppler examination to evaluate her left ventricular function, chamber size, rule out mitral valve prolapse and rule out hypertrophy.\par Lifestyle modification was also discussed, and I would recommend that she work with our registered dietitian after the holiday season.\par The patient understands that aerobic exercises must be increased to 40 minutes 4 times per week. A detailed discussion of lifestyle modification was done today. The patient has a good understanding of the diagnosis, and treatment plan. Lifestyle modification was also outlined.\par She is instructed to follow-up with her primary care physician.  She will follow-up with me after above-noted diagnostic tests are completed\par Patient is a full-t time anesthesiologist. \par \par Thank you for allowing to participate in the care of your patient.  Please do not hesitate to call if you have any further questions.

## 2022-12-12 RX ORDER — SODIUM PICOSULFATE, MAGNESIUM OXIDE, AND ANHYDROUS CITRIC ACID 10; 3.5; 12 MG/16.2G; G/16.2G; G/16.2G
10-3.5-12 POWDER, METERED ORAL
Qty: 2 | Refills: 0 | Status: DISCONTINUED | COMMUNITY
Start: 2017-07-25 | End: 2022-12-12

## 2022-12-12 RX ORDER — EZETIMIBE 10 MG/1
10 TABLET ORAL
Qty: 90 | Refills: 3 | Status: DISCONTINUED | COMMUNITY
Start: 2022-09-30 | End: 2022-12-12

## 2022-12-20 LAB
ANA SER IF-ACNC: NEGATIVE
C3 SERPL-MCNC: 151 MG/DL
C4 SERPL-MCNC: 37 MG/DL
CCP AB SER IA-ACNC: <8 UNITS
CENTROMERE IGG SER-ACNC: <0.2 CD:130001892
DSDNA AB SER-ACNC: <12 IU/ML
ENA SS-A AB SER IA-ACNC: <0.2 AL
ENA SS-B AB SER IA-ACNC: <0.2 AL
ERYTHROCYTE [SEDIMENTATION RATE] IN BLOOD BY WESTERGREN METHOD: 4 MM/HR
RF+CCP IGG SER-IMP: NEGATIVE
RHEUMATOID FACT SER QL: <10 IU/ML

## 2022-12-26 PROBLEM — R06.00 DOE (DYSPNEA ON EXERTION): Status: ACTIVE | Noted: 2022-12-02

## 2022-12-26 PROBLEM — R07.9 CHEST PAIN: Status: ACTIVE | Noted: 2022-11-29

## 2023-01-23 ENCOUNTER — RX RENEWAL (OUTPATIENT)
Age: 53
End: 2023-01-23

## 2023-02-08 ENCOUNTER — APPOINTMENT (OUTPATIENT)
Dept: CARDIOLOGY | Facility: CLINIC | Age: 53
End: 2023-02-08

## 2023-03-14 ENCOUNTER — APPOINTMENT (OUTPATIENT)
Dept: CARDIOLOGY | Facility: CLINIC | Age: 53
End: 2023-03-14

## 2023-06-07 ENCOUNTER — APPOINTMENT (OUTPATIENT)
Dept: CARDIOLOGY | Facility: CLINIC | Age: 53
End: 2023-06-07
Payer: COMMERCIAL

## 2023-06-07 ENCOUNTER — LABORATORY RESULT (OUTPATIENT)
Age: 53
End: 2023-06-07

## 2023-06-07 VITALS
HEIGHT: 62 IN | HEART RATE: 64 BPM | OXYGEN SATURATION: 98 % | BODY MASS INDEX: 24.11 KG/M2 | SYSTOLIC BLOOD PRESSURE: 118 MMHG | TEMPERATURE: 97.7 F | RESPIRATION RATE: 16 BRPM | DIASTOLIC BLOOD PRESSURE: 84 MMHG | WEIGHT: 131 LBS

## 2023-06-07 DIAGNOSIS — E78.00 PURE HYPERCHOLESTEROLEMIA, UNSPECIFIED: ICD-10-CM

## 2023-06-07 PROCEDURE — 99214 OFFICE O/P EST MOD 30 MIN: CPT | Mod: 25

## 2023-06-07 RX ORDER — ROSUVASTATIN CALCIUM 20 MG/1
20 TABLET, FILM COATED ORAL DAILY
Qty: 90 | Refills: 3 | Status: DISCONTINUED | COMMUNITY
Start: 2018-07-19 | End: 2023-06-07

## 2023-06-07 NOTE — ASSESSMENT
[FreeTextEntry1] : This is a 53-year-old female, with past medical history significant for hypercholesterolemia, statin intolerance, status post COVID-19 infection in January 2021, lipid/cardiac follow-up evaluation.\par \par She was born in [facility] and has no history of rheumatic fever.  She does not drink excessive caffeine or alcohol. Her cardiac risk factors include familial hypercholesterolemia (both her mother and father had a very high cholesterol, and her son has hypercholesterolemia).\par \par Patient is an anesthesiologist. \par \par CHIEF COMPLAINT:\par Today she is feeling generally well and does not have any complaints at this time. \par \par She is currently prescribed Praluent 150 mg SQ injection done every 2 weeks tolerating well.\par \par She denies fever, chills, weight loss, malaise, rash, alteration bowel habits, weakness, abdominal  pain, bloating, changes in urination, visual disturbances, chest pain, headaches, dizziness, heart palpitations, recent episodes of syncope or falls at this time.\par \par BLOOD PRESSURE:\par -BP is well controlled in today's visit.\par \par BLOOD WORK:\par -New blood work was done 06/07/2023 to evaluate lipid profile, CBC, BMP, hepatic function, A1C and TSH\par \par Blood work done December 2, 2022 demonstrated a cholesterol 284, HDL of 59, triglycerides of 306, (nonfasting), non-HDL cholesterol 225, direct LDL of 184 mg/dL with hemoglobin A1c of 5.9.\par \par The patient will require preauthorization before starting on Praluent 150 mg subcutaneously every 2 weeks.\par She understands she must go for blood work 1 week after the second dose of Praluent.\par \par *She cannot take Zetia therapy because of developing swollen eyes and face after taking 1 dose of the medication.She was started on Zetia 10 mg daily and developed eye swelling and upper nasal congestion and stopped this medication.\par \par *The patient developed severe muscle aches on atorvastatin and rosuvastatin which were persistent and incapacitating.  She rechallenged herself on these medications and was unable to tolerate them secondary to myalgias.  She also was unable to tolerate simvastatin and pravastatin by her report due to similar muscle symptoms.\par \par -Blood work done September 28, 2022 demonstrated hemoglobin A1c of 5.6, cholesterol 286, triglycerides 122, HDL 60, LDL calculated 202 mg/dL and non-HDL cholesterol 226 mg/dL.\par \par These findings are consistent with heterozygous familial hypercholesterolemia.\par \par The patient is clearly statin intolerant and requires PCSK9 injectable therapy to achieve an LDL cholesterol of less than 100 mg/dL.  She will start on Praluent 150 mg subcutaneously every 2 weeks, (preferred by insurance), and will have blood work 1 week after the second shot is completed.\par \par TESTING/REPORTS:\par -The patient had normal exercise stress test December 6, 2022.\par -An echocardiogram was done in the office 12/6/2022 which demonstrated mild mitral valve regurgitation, minimal tricuspid regurgitation, minimal pulmonic regurgitation with normal left ventricular systolic function ejection fraction 65%\par \par PLAN:\par -The patient is clinically stable from a cardiac standpoint on today's exam.\par -She may benefit from a calcium score to evaluate her risk for coronary artery disease in the setting of familial hypercholesterolemia.\par -She will continue with her usual medications and will contact the office if she is having any complaints between now and their next follow up appointment.\par \par I have discussed the plan of care with Ms. COLLIN ZHOU  and she  will follow up in 4-6 months. She is compliant with all of her medications.\par \par The patient understands that aerobic exercises must be increased to at least 20 minutes 4 times/week along with maintaining lifestyle modifications including well-maintained diet. She will contact me at the office for any questions with their care or any changes in their health status.\par \par David FRAZIER

## 2023-06-07 NOTE — DISCUSSION/SUMMARY
[FreeTextEntry1] : Dr. Simon-(PRIOR VISIT and PMH WITH Dr. Simon): \par This is a  52-year-old female, with past medical history significant for hypercholesterolemia, statin intolerance, status post COVID-19 infection in January 2021, lipid/cardiac follow-up evaluation.\par She denies chest pain, shortness of breath, dizziness or syncope.  She was born in [facility] and has no history of rheumatic fever.  She does not drink excessive caffeine or alcohol.\par Her cardiac risk factors include familial hypercholesterolemia (both her mother and father had a very high cholesterol, and her son has hypercholesterolemia).\par The patient had normal exercise stress test December 6, 2022.\par Blood work done December 2, 2022 demonstrated a cholesterol 284, HDL of 59, triglycerides of 306, (nonfasting), non-HDL cholesterol 225, direct LDL of 184 mg/dL with hemoglobin A1c of 5.9.\par The patient will require preauthorization before starting on Praluent 150 mg subcutaneously every 2 weeks.\par She understands she must go for blood work 1 week after the second dose of Praluent.\par She cannot take Zetia therapy because of developing swollen eyes and face after taking 1 dose of the medication\par The patient developed severe muscle aches on atorvastatin and rosuvastatin which were persistent and incapacitating.  She rechallenged herself on these medications and was unable to tolerate them secondary to myalgias.  She also was unable to tolerate simvastatin and pravastatin by her report due to similar muscle symptoms.\par She was started on Zetia 10 mg daily and developed eye swelling and upper nasal congestion and stopped this medication.\par Blood work done September 28, 2022 demonstrated hemoglobin A1c of 5.6, cholesterol 286, triglycerides 122, HDL 60, LDL calculated 202 mg/dL and non-HDL cholesterol 226 mg/dL.\par These findings are consistent with heterozygous familial hypercholesterolemia.\par The patient is clearly statin intolerant and requires PCSK9 injectable therapy to achieve an LDL cholesterol of less than 100 mg/dL.  She will start on Praluent 150 mg subcutaneously every 2 weeks, (preferred by insurance), and will have blood work 1 week after the second shot is completed.\par \par Lifestyle modification was also discussed, and I would recommend that she work with our registered dietitian after the holiday season.\par The patient understands that aerobic exercises must be increased to 40 minutes 4 times per week. A detailed discussion of lifestyle modification was done today. The patient has a good understanding of the diagnosis, and treatment plan. Lifestyle modification was also outlined.\par She is instructed to follow-up with her primary care physician.  She will follow-up with me after above-noted diagnostic tests are completed\par Patient is an anesthesiologist. \par \par Thank you for allowing to participate in the care of your patient.  Please do not hesitate to call if you have any further questions.

## 2023-06-07 NOTE — REASON FOR VISIT
[CV Risk Factors and Non-Cardiac Disease] : CV risk factors and non-cardiac disease [Hyperlipidemia] : hyperlipidemia [FreeTextEntry1] : PMHx: \par This is a 52 year old female patient with past medical history of hypercholesterolemia and status- post Covid infection in January 2021 presents today for lipid consultation. Patient states she is referred by her Internist Dr. Arroyo. She states she has had hypercholesterolemia for many years. Significant family history includes her mom suffering from hypertension and hypercholesterolemia, her father suffered from hypertension, aortic valve replacement, and hypercholesterolemia, and her 17- year old son also suffers from hypercholesterolemia. Patient states that she has taken Lipitor 10mg and Crestor 10mg in the past with unbearable side effects of muscle pain and weakness. She was rechallenged with the statin medications and still had those side effects. She also states that she tried Ezetimibe 10 mg and she woke up the following day with her eyes swollen and feeling congested. Patient denies ever smoking and does not drink alcohol. She states she has about half a cup of coffee in the mornings. Past surgeries include C- section in 2005 and myomectomy in 2003. Blood work on 9/28/22 demonstrates A1C 5.6, triglyceride 122, cholesterol 286, HDL 60, LDL (calc) 202. Patient denies chest pain, heart palpitation, SOB. Patient is a full-t time anesthesiologist.

## 2023-06-30 ENCOUNTER — RX CHANGE (OUTPATIENT)
Age: 53
End: 2023-06-30

## 2023-08-07 ENCOUNTER — NON-APPOINTMENT (OUTPATIENT)
Age: 53
End: 2023-08-07

## 2023-08-07 RX ORDER — ALIROCUMAB 150 MG/ML
150 INJECTION, SOLUTION SUBCUTANEOUS
Qty: 6 | Refills: 1 | Status: DISCONTINUED | COMMUNITY
Start: 2022-12-02 | End: 2023-08-07

## 2023-09-28 ENCOUNTER — APPOINTMENT (OUTPATIENT)
Dept: INTERNAL MEDICINE | Facility: CLINIC | Age: 53
End: 2023-09-28

## 2023-11-20 ENCOUNTER — LABORATORY RESULT (OUTPATIENT)
Age: 53
End: 2023-11-20

## 2023-11-20 ENCOUNTER — NON-APPOINTMENT (OUTPATIENT)
Age: 53
End: 2023-11-20

## 2023-11-20 ENCOUNTER — APPOINTMENT (OUTPATIENT)
Dept: CARDIOLOGY | Facility: CLINIC | Age: 53
End: 2023-11-20
Payer: COMMERCIAL

## 2023-11-20 VITALS
RESPIRATION RATE: 16 BRPM | HEIGHT: 62 IN | DIASTOLIC BLOOD PRESSURE: 78 MMHG | BODY MASS INDEX: 24.48 KG/M2 | WEIGHT: 133 LBS | TEMPERATURE: 97.9 F | SYSTOLIC BLOOD PRESSURE: 124 MMHG | OXYGEN SATURATION: 99 % | HEART RATE: 70 BPM

## 2023-11-20 DIAGNOSIS — R01.1 CARDIAC MURMUR, UNSPECIFIED: ICD-10-CM

## 2023-11-20 DIAGNOSIS — E78.01 FAMILIAL HYPERCHOLESTEROLEMIA: ICD-10-CM

## 2023-11-20 DIAGNOSIS — R73.03 PREDIABETES.: ICD-10-CM

## 2023-11-20 DIAGNOSIS — Z78.9 OTHER SPECIFIED HEALTH STATUS: ICD-10-CM

## 2023-11-20 PROCEDURE — 93000 ELECTROCARDIOGRAM COMPLETE: CPT

## 2023-11-20 PROCEDURE — 99214 OFFICE O/P EST MOD 30 MIN: CPT | Mod: 25

## 2024-05-14 RX ORDER — EVOLOCUMAB 140 MG/ML
140 INJECTION, SOLUTION SUBCUTANEOUS
Qty: 6 | Refills: 1 | Status: ACTIVE | COMMUNITY
Start: 2023-08-07

## 2024-07-30 PROBLEM — R93.89 THICKENED ENDOMETRIUM: Status: ACTIVE | Noted: 2024-07-30

## 2024-07-30 PROBLEM — N95.0 PMB (POSTMENOPAUSAL BLEEDING): Status: ACTIVE | Noted: 2024-07-30

## 2024-07-31 ENCOUNTER — APPOINTMENT (OUTPATIENT)
Dept: GYNECOLOGIC ONCOLOGY | Facility: CLINIC | Age: 54
End: 2024-07-31
Payer: COMMERCIAL

## 2024-07-31 VITALS
WEIGHT: 138 LBS | SYSTOLIC BLOOD PRESSURE: 132 MMHG | TEMPERATURE: 97.4 F | RESPIRATION RATE: 16 BRPM | OXYGEN SATURATION: 98 % | BODY MASS INDEX: 25.4 KG/M2 | HEIGHT: 62 IN | DIASTOLIC BLOOD PRESSURE: 85 MMHG | HEART RATE: 82 BPM

## 2024-07-31 DIAGNOSIS — R93.89 ABNORMAL FINDINGS ON DIAGNOSTIC IMAGING OF OTHER SPECIFIED BODY STRUCTURES: ICD-10-CM

## 2024-07-31 DIAGNOSIS — N95.0 POSTMENOPAUSAL BLEEDING: ICD-10-CM

## 2024-07-31 PROCEDURE — 58100 BIOPSY OF UTERUS LINING: CPT

## 2024-07-31 PROCEDURE — 99459 PELVIC EXAMINATION: CPT

## 2024-07-31 PROCEDURE — 99203 OFFICE O/P NEW LOW 30 MIN: CPT | Mod: 25

## 2024-07-31 NOTE — HISTORY OF PRESENT ILLNESS
[FreeTextEntry1] : Referred by Self  GYN Dr. Pradip Muñoz  PCP Dr. Margarette Leija Cards Dr. Garrick Simon ColoRectal Dr. Sean Vasquez is a 54-year-old  self-referred for further evaluation and management of postmenopausal bleeding and thickened endometrium. Recent biopsy performed with her GYN did not show any endometrial tissue.   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   She denies VD or pelvic pain.  She denies change in bowel in urinary habits.  She denies nausea/vomiting.  She denies all other associated signs and symptoms.  She is here to discuss further management. Within the last year started low dose HRT 0.25 mcg estrogen and 100 mg progesterone daily.   2024 Endometrial biopsy -  No endometrial tissue identified. Fragments of benign endocervical glands  2024 TVUS (Renée Decker)  - Uterus 7.4 x 4.7 x 4.0 cm, anteverted. Fibroids seen largest measuring 1.1 x 0.9 x 1.0 cm. Cervical fibroid seen measuring 1 x 1.2 x 1 cm.  - Endometrium 0.5 cm, borderline prominent - RTO 2.1 x 1.7 x 1.6 cm - LTO not seen  - No free fluid  PMH: HLD,  PSH:  section, myomectomy Family history cancer: Breast (paternal cousin)   Pap 2024 NILM Mammo 4/15/24 BIRADS 1  Colonoscopy , +polyps (benign), repeat 3 years per GI

## 2024-07-31 NOTE — ASSESSMENT
[FreeTextEntry1] : PMB with 5 mm endometrium on recent US.  Unsuccessful sampling in primary GYN office.

## 2024-07-31 NOTE — DISCUSSION/SUMMARY
[Reviewed Clinical Lab Test(s)] : Results of clinical tests were reviewed. [Reviewed Radiology Report(s)] : Radiology reports were reviewed. [Reviewed Radiology Film/Image(s)] : Images from radiology studies were reviewed and examined. [Discuss Alternatives/Risks/Benefits w/Patient] : All alternatives, risks, and benefits were discussed with the patient/family and all questions were answered.  Patient expressed good understanding and appreciates the importance of follow up as recommended. [FreeTextEntry1] : I sat down with Marcie and reviewed the DDx of PMB including benign, hyperplastic and malignant endometrial neoplasms.  Endometrial sampling is indicated.  Discussed options including repeat attempt at EMBx in office under paracervical block or formal D&C under anesthesia.  Elected for office biopsy.  Successful entry into endometrial cavity and sample obtained.  Await biopsy results for further management.

## 2024-07-31 NOTE — PHYSICAL EXAM
[Chaperone Present] : A chaperone was present in the examining room during all aspects of the physical examination [60808] : A chaperone was present during the pelvic exam. [FreeTextEntry2] : Ed [Normal] : Anus and perineum: Normal sphincter tone, no masses, no prolapse. [Fully active, able to carry on all pre-disease performance without restriction] : Status 0 - Fully active, able to carry on all pre-disease performance without restriction

## 2024-07-31 NOTE — OB HISTORY
[Total Preg ___] : : [unfilled] [Abortions ___] : [unfilled] (abortions) [Living ___] : [unfilled] (living) [ ___] : [unfilled]  section delivery(s)

## 2024-08-09 LAB — CORE LAB BIOPSY: NORMAL

## 2024-08-26 ENCOUNTER — LABORATORY RESULT (OUTPATIENT)
Age: 54
End: 2024-08-26

## 2024-08-26 ENCOUNTER — APPOINTMENT (OUTPATIENT)
Dept: CARDIOLOGY | Facility: CLINIC | Age: 54
End: 2024-08-26
Payer: COMMERCIAL

## 2024-08-26 VITALS
HEIGHT: 62 IN | DIASTOLIC BLOOD PRESSURE: 79 MMHG | RESPIRATION RATE: 16 BRPM | SYSTOLIC BLOOD PRESSURE: 126 MMHG | WEIGHT: 137 LBS | TEMPERATURE: 98.1 F | OXYGEN SATURATION: 98 % | BODY MASS INDEX: 25.21 KG/M2 | HEART RATE: 78 BPM

## 2024-08-26 DIAGNOSIS — R01.1 CARDIAC MURMUR, UNSPECIFIED: ICD-10-CM

## 2024-08-26 DIAGNOSIS — Z78.9 OTHER SPECIFIED HEALTH STATUS: ICD-10-CM

## 2024-08-26 DIAGNOSIS — E78.00 PURE HYPERCHOLESTEROLEMIA, UNSPECIFIED: ICD-10-CM

## 2024-08-26 PROCEDURE — G2211 COMPLEX E/M VISIT ADD ON: CPT

## 2024-08-26 PROCEDURE — 99214 OFFICE O/P EST MOD 30 MIN: CPT

## 2024-08-26 NOTE — DISCUSSION/SUMMARY
History and Physical - [facility]  Carlos A Freeman 53 y.o. male MRN: [id number]                  HPI: Carlos A Freeman is a 53 y.o. year old male who presents for screening colonoscopy      REVIEW OF SYSTEMS: Per the HPI, and otherwise unremarkable.    Historical Information   Past Medical History:   Diagnosis Date    Anxiety     Asthma     GERD (gastroesophageal reflux disease)     History of Daniel-Barr virus infection 10/31/2016    Insomnia     OCD (obsessive compulsive disorder)     Pneumonia 10/31/2016    Overview:  History of     Seizure disorder (HCC)      Past Surgical History:   Procedure Laterality Date    HERNIA REPAIR      ROTATOR CUFF REPAIR Left     WISDOM TOOTH EXTRACTION       Social History   Social History     Substance and Sexual Activity   Alcohol Use Not Currently    Alcohol/week: 3.0 standard drinks of alcohol    Types: 3 Standard drinks or equivalent per week     Social History     Substance and Sexual Activity   Drug Use Yes    Types: Marijuana     Social History     Tobacco Use   Smoking Status Every Day    Current packs/day: 0.50    Average packs/day: 0.5 packs/day for 30.0 years (15.0 ttl pk-yrs)    Types: Cigarettes    Passive exposure: Current   Smokeless Tobacco Never     Family History   Problem Relation Age of Onset    Heart disease Family     Diabetes Family     Heart disease Mother     Heart failure Mother     Dementia Mother     Hypertension Mother     Hyperlipidemia Mother     Heart disease Father     Heart failure Father     COPD Father     Hypertension Father     Hyperlipidemia Father     Heart disease Brother     Heart failure Brother     COPD Brother     Colon cancer Brother     Hypertension Brother     Hyperlipidemia Brother     Stroke Maternal Grandmother     Prostate cancer Neg Hx     Depression Neg Hx     Mental illness Neg Hx     Substance Abuse Neg Hx        Meds/Allergies       Current Outpatient Medications:     albuterol (PROVENTIL HFA,VENTOLIN HFA)  [FreeTextEntry1] : This is a  53-year-old female, with past medical history significant for hypercholesterolemia, statin intolerance, status post COVID-19 infection in January 2021, lipid/cardiac follow-up evaluation. She denies chest pain, shortness of breath, dizziness or syncope.  She was born in [facility] and has no history of rheumatic fever.  She does not drink excessive caffeine or alcohol. Her cardiac risk factors include familial hypercholesterolemia (both her mother and father had a very high cholesterol, and her son has hypercholesterolemia). The patient will have new blood work done today for lipid panel, SMA 20, and hemoglobin A1c. Lipid profile done November 20, 2023 demonstrated cholesterol of 188, HDL of 58, triglycerides 221, LDL calculated 93, non-HDL cholesterol 130 mg/dL with hemoglobin A1c of 5.7 and LDL direct of 96 mg/dL. The patient is also interested in GLP-1 agonist therapy.  She feels that she is gaining weight.  The patient's BMI does not support the use of these medications.  I have recommended she work with a registered dietitian and increase her aerobic activity 40 minutes 4 times per week. The patient is tolerating Repatha 140 mg subcutaneously biweekly without side effect. Lipid panel done June 7, 2023 demonstrated a cholesterol 170, HDL 56, triglycerides 174, LDL calculated 78, LDL direct 93 mg/dL and non-HDL cholesterol 113 mg/dL with hemoglobin A1c of 5.8. She will have new blood work done today for lipid profile. Echo Doppler examination done December 6, 2022 demonstrated normal left ventricular function with estimated ejection fraction 65%, minimal pulmonic valve regurgitation, mild mitral valve regurgitation, minimal tricuspid valve regurgitation. Electrocardiogram done November 20, 2023 demonstrated normal sinus rhythm rate 70 bpm is otherwise unremarkable. The patient had normal exercise stress test December 6, 2022. Blood work done December 2, 2022 demonstrated a cholesterol 284, HDL of 59, triglycerides of 306, (nonfasting), non-HDL cholesterol 225, direct LDL of 184 mg/dL with hemoglobin A1c of 5.9. The patient will r She cannot take Zetia therapy because of developing swollen eyes and face after taking a few doses of the medication The patient developed severe muscle aches on atorvastatin, zocor, and rosuvastatin which were persistent and incapacitating.  She rechallenged herself on these medications and was unable to tolerate them secondary to myalgias.  She also was unable to tolerate simvastatin and pravastatin by her report due to similar muscle symptoms. She was started on Zetia 10 mg daily and developed eye swelling and upper nasal congestion and stopped this medication. Blood work done September 28, 2022 demonstrated hemoglobin A1c of 5.6, cholesterol 286, triglycerides 122, HDL 60, LDL calculated 202 mg/dL and non-HDL cholesterol 226 mg/dL. These findings are consistent with heterozygous familial hypercholesterolemia. The patient is clearly statin intolerant and requires PCSK9 injectable therapy to achieve an LDL cholesterol of less than 100 mg/dL.  She will start on Praluent 150 mg subcutaneously every 2 weeks, (preferred by insurance), and will have blood work 1 week after the second shot is completed.  Lifestyle modification was also discussed, and I would recommend that she work with our registered dietitian after the holiday season. The patient understands that aerobic exercises must be increased to 40 minutes 4 times per week. A detailed discussion of lifestyle modification was done today. The patient has a good understanding of the diagnosis, and treatment plan. Lifestyle modification was also outlined. She is instructed to follow-up with her primary care physician.  She will follow-up with me after above-noted diagnostic tests are completed Patient is an anesthesiologist.   Thank you for allowing to participate in the care of your patient.  Please do not hesitate to call if you have any further questions. "90 mcg/act inhaler    Budeson-Glycopyrrol-Formoterol (Breztri Aerosphere) 160-9-4.8 MCG/ACT AERO    divalproex sodium (DEPAKOTE) 250 mg DR tablet    famotidine (PEPCID) 20 mg tablet    nicotine polacrilex (NICORETTE) 4 mg gum    tadalafil (CIALIS) 10 MG tablet    No Known Allergies    Objective     /73   Pulse 88   Temp 97.5 °F (36.4 °C) (Temporal)   Resp 12   Ht 5' 8\" (1.727 m)   Wt 63.6 kg (140 lb 4.8 oz)   SpO2 98%   BMI 21.33 kg/m²       PHYSICAL EXAM    Gen: NAD  Head: NCAT  CV: RRR  CHEST: Clear  ABD: soft, NT/ND  EXT: no edema      ASSESSMENT/PLAN:  This is a 53 y.o. year old male here for colonoscopy, and he is stable and optimized for his procedure.        "

## 2024-08-26 NOTE — REASON FOR VISIT
[CV Risk Factors and Non-Cardiac Disease] : CV risk factors and non-cardiac disease [Hyperlipidemia] : hyperlipidemia [FreeTextEntry1] : This is a 53-year-old female patient with past medical history of hypercholesterolemia and status- post Covid infection in January 2021 presents today for lipid follow up. She states she has had hypercholesterolemia for many years.  Significant family history includes her mom suffering from hypertension and hypercholesterolemia, her father suffered from hypertension, aortic valve replacement, and hypercholesterolemia, and her son also suffers from hypercholesterolemia. Patient reports concern about gaining weight- interested in zepbound- BMI 25.06 will refer to dietitian  Blood work on 11/21/23 demonstrates A1C 5.7, triglyceride 221, cholesterol 188, HDL 58, LDL (calc) 93    Patient states that she has taken Lipitor 10mg, Zocor, and Crestor 10mg in the past with unbearable side effects of muscle pain and weakness. She was rechallenged with the statin medications and still had those side effects. Patient denies ever smoking and does not drink alcohol. She states she has about half a cup of coffee in the mornings. Past surgeries include C- section in 2005 and myomectomy in 2003.  Patient denies chest pain, heart palpitation, SOB.  Patient is a full-t time anesthesiologist.

## 2024-08-26 NOTE — DISCUSSION/SUMMARY
[FreeTextEntry1] : This is a  53-year-old female, with past medical history significant for hypercholesterolemia, statin intolerance, status post COVID-19 infection in January 2021, lipid/cardiac follow-up evaluation. She denies chest pain, shortness of breath, dizziness or syncope.  She was born in [facility] and has no history of rheumatic fever.  She does not drink excessive caffeine or alcohol. Her cardiac risk factors include familial hypercholesterolemia (both her mother and father had a very high cholesterol, and her son has hypercholesterolemia). The patient will have new blood work done today for lipid panel, SMA 20, and hemoglobin A1c. Lipid profile done November 20, 2023 demonstrated cholesterol of 188, HDL of 58, triglycerides 221, LDL calculated 93, non-HDL cholesterol 130 mg/dL with hemoglobin A1c of 5.7 and LDL direct of 96 mg/dL. The patient is also interested in GLP-1 agonist therapy.  She feels that she is gaining weight.  The patient's BMI does not support the use of these medications.  I have recommended she work with a registered dietitian and increase her aerobic activity 40 minutes 4 times per week. The patient is tolerating Repatha 140 mg subcutaneously biweekly without side effect. Lipid panel done June 7, 2023 demonstrated a cholesterol 170, HDL 56, triglycerides 174, LDL calculated 78, LDL direct 93 mg/dL and non-HDL cholesterol 113 mg/dL with hemoglobin A1c of 5.8. She will have new blood work done today for lipid profile. Echo Doppler examination done December 6, 2022 demonstrated normal left ventricular function with estimated ejection fraction 65%, minimal pulmonic valve regurgitation, mild mitral valve regurgitation, minimal tricuspid valve regurgitation. Electrocardiogram done November 20, 2023 demonstrated normal sinus rhythm rate 70 bpm is otherwise unremarkable. The patient had normal exercise stress test December 6, 2022. Blood work done December 2, 2022 demonstrated a cholesterol 284, HDL of 59, triglycerides of 306, (nonfasting), non-HDL cholesterol 225, direct LDL of 184 mg/dL with hemoglobin A1c of 5.9. The patient will r She cannot take Zetia therapy because of developing swollen eyes and face after taking a few doses of the medication The patient developed severe muscle aches on atorvastatin, zocor, and rosuvastatin which were persistent and incapacitating.  She rechallenged herself on these medications and was unable to tolerate them secondary to myalgias.  She also was unable to tolerate simvastatin and pravastatin by her report due to similar muscle symptoms. She was started on Zetia 10 mg daily and developed eye swelling and upper nasal congestion and stopped this medication. Blood work done September 28, 2022 demonstrated hemoglobin A1c of 5.6, cholesterol 286, triglycerides 122, HDL 60, LDL calculated 202 mg/dL and non-HDL cholesterol 226 mg/dL. These findings are consistent with heterozygous familial hypercholesterolemia. The patient is clearly statin intolerant and requires PCSK9 injectable therapy to achieve an LDL cholesterol of less than 100 mg/dL.  She will start on Praluent 150 mg subcutaneously every 2 weeks, (preferred by insurance), and will have blood work 1 week after the second shot is completed.  Lifestyle modification was also discussed, and I would recommend that she work with our registered dietitian after the holiday season. The patient understands that aerobic exercises must be increased to 40 minutes 4 times per week. A detailed discussion of lifestyle modification was done today. The patient has a good understanding of the diagnosis, and treatment plan. Lifestyle modification was also outlined. She is instructed to follow-up with her primary care physician.  She will follow-up with me after above-noted diagnostic tests are completed Patient is an anesthesiologist.   Thank you for allowing to participate in the care of your patient.  Please do not hesitate to call if you have any further questions.

## 2024-11-06 DIAGNOSIS — E78.00 PURE HYPERCHOLESTEROLEMIA, UNSPECIFIED: ICD-10-CM

## 2024-11-06 DIAGNOSIS — R01.1 CARDIAC MURMUR, UNSPECIFIED: ICD-10-CM

## 2025-02-11 ENCOUNTER — LABORATORY RESULT (OUTPATIENT)
Age: 55
End: 2025-02-11

## 2025-02-11 ENCOUNTER — APPOINTMENT (OUTPATIENT)
Dept: CARDIOLOGY | Facility: CLINIC | Age: 55
End: 2025-02-11
Payer: COMMERCIAL

## 2025-02-11 VITALS
HEART RATE: 73 BPM | WEIGHT: 136 LBS | OXYGEN SATURATION: 97 % | DIASTOLIC BLOOD PRESSURE: 82 MMHG | SYSTOLIC BLOOD PRESSURE: 127 MMHG | BODY MASS INDEX: 25.03 KG/M2 | RESPIRATION RATE: 16 BRPM | TEMPERATURE: 98 F | HEIGHT: 62 IN

## 2025-02-11 DIAGNOSIS — E78.01 FAMILIAL HYPERCHOLESTEROLEMIA: ICD-10-CM

## 2025-02-11 DIAGNOSIS — E78.00 PURE HYPERCHOLESTEROLEMIA, UNSPECIFIED: ICD-10-CM

## 2025-02-11 DIAGNOSIS — R01.1 CARDIAC MURMUR, UNSPECIFIED: ICD-10-CM

## 2025-02-11 DIAGNOSIS — Z78.9 OTHER SPECIFIED HEALTH STATUS: ICD-10-CM

## 2025-02-11 DIAGNOSIS — R06.09 OTHER FORMS OF DYSPNEA: ICD-10-CM

## 2025-02-11 DIAGNOSIS — R73.03 PREDIABETES.: ICD-10-CM

## 2025-02-11 PROCEDURE — 93015 CV STRESS TEST SUPVJ I&R: CPT

## 2025-02-11 PROCEDURE — 99213 OFFICE O/P EST LOW 20 MIN: CPT | Mod: 25

## 2025-03-05 ENCOUNTER — RX RENEWAL (OUTPATIENT)
Age: 55
End: 2025-03-05

## 2025-04-01 ENCOUNTER — APPOINTMENT (OUTPATIENT)
Dept: CARDIOLOGY | Facility: CLINIC | Age: 55
End: 2025-04-01

## 2025-04-01 PROCEDURE — 93306 TTE W/DOPPLER COMPLETE: CPT

## 2025-06-17 ENCOUNTER — OUTPATIENT (OUTPATIENT)
Dept: OUTPATIENT SERVICES | Facility: HOSPITAL | Age: 55
LOS: 1 days | End: 2025-06-17
Payer: COMMERCIAL

## 2025-06-17 VITALS
WEIGHT: 138.89 LBS | HEIGHT: 62 IN | OXYGEN SATURATION: 97 % | HEART RATE: 85 BPM | RESPIRATION RATE: 20 BRPM | DIASTOLIC BLOOD PRESSURE: 88 MMHG | TEMPERATURE: 99 F | SYSTOLIC BLOOD PRESSURE: 132 MMHG

## 2025-06-17 DIAGNOSIS — Z98.890 OTHER SPECIFIED POSTPROCEDURAL STATES: Chronic | ICD-10-CM

## 2025-06-17 DIAGNOSIS — N95.1 MENOPAUSAL AND FEMALE CLIMACTERIC STATES: ICD-10-CM

## 2025-06-17 DIAGNOSIS — Z98.891 HISTORY OF UTERINE SCAR FROM PREVIOUS SURGERY: Chronic | ICD-10-CM

## 2025-06-17 DIAGNOSIS — R93.89 ABNORMAL FINDINGS ON DIAGNOSTIC IMAGING OF OTHER SPECIFIED BODY STRUCTURES: ICD-10-CM

## 2025-06-17 LAB
HCT VFR BLD CALC: 43 % — SIGNIFICANT CHANGE UP (ref 34.5–45)
HGB BLD-MCNC: 14.1 G/DL — SIGNIFICANT CHANGE UP (ref 11.5–15.5)
MCHC RBC-ENTMCNC: 29.5 PG — SIGNIFICANT CHANGE UP (ref 27–34)
MCHC RBC-ENTMCNC: 32.8 G/DL — SIGNIFICANT CHANGE UP (ref 32–36)
MCV RBC AUTO: 90 FL — SIGNIFICANT CHANGE UP (ref 80–100)
NRBC # BLD AUTO: 0 K/UL — SIGNIFICANT CHANGE UP (ref 0–0)
NRBC # FLD: 0 K/UL — SIGNIFICANT CHANGE UP (ref 0–0)
NRBC BLD AUTO-RTO: 0 /100 WBCS — SIGNIFICANT CHANGE UP (ref 0–0)
PLATELET # BLD AUTO: 344 K/UL — SIGNIFICANT CHANGE UP (ref 150–400)
PMV BLD: 9.8 FL — SIGNIFICANT CHANGE UP (ref 7–13)
RBC # BLD: 4.78 M/UL — SIGNIFICANT CHANGE UP (ref 3.8–5.2)
RBC # FLD: 12.5 % — SIGNIFICANT CHANGE UP (ref 10.3–14.5)
WBC # BLD: 10.31 K/UL — SIGNIFICANT CHANGE UP (ref 3.8–10.5)
WBC # FLD AUTO: 10.31 K/UL — SIGNIFICANT CHANGE UP (ref 3.8–10.5)

## 2025-06-17 PROCEDURE — G0463: CPT

## 2025-06-17 PROCEDURE — 85027 COMPLETE CBC AUTOMATED: CPT

## 2025-06-17 NOTE — H&P PST ADULT - PROBLEM SELECTOR PLAN 1
Plan for D&C, diagnostic hysteroscopy, possible polypectomy with Avpolly, ultrasound guidance on 6/27/25 with Dr. Muñoz.   PST labs sent  (CBC)  Pre procedure instructions discussed  Pre-op education provided - all questions answered

## 2025-06-17 NOTE — H&P PST ADULT - NSICDXPASTSURGICALHX_GEN_ALL_CORE_FT
PAST SURGICAL HISTORY:  H/O  section     H/O knee surgery     H/O myomectomy     History of colposcopy

## 2025-06-17 NOTE — H&P PST ADULT - HISTORY OF PRESENT ILLNESS
56 yo F with PMH of HLD, post-menopausal bleeding, and thickened endometrium.  Plan for D&C, diagnostic hysteroscopy, possible polypectomy with Aveta, ultrasound guidance on 6/27/25 with Dr. Muñoz.  54 yo F,    with PMH of HLD-statin resistant, on Repatha, follows with cardiology, post menopausal, on HRT with thickened endometrium.  Plan for D&C, diagnostic hysteroscopy, possible polypectomy with Kem, ultrasound guidance on 25 with Dr. Muñoz.

## 2025-06-17 NOTE — H&P PST ADULT - HEIGHT IN CM
[FreeTextEntry1] : Bryon Middleton is a 73 y/o with HTN and Hchol  - cath with severe small vessel CAD\par \par Cont with lipitor\par No angina\par BP acceptable\par Cont with present meds\par Check labs\par   \par \par 
157.48

## 2025-06-17 NOTE — H&P PST ADULT - ASSESSMENT
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

## 2025-06-17 NOTE — H&P PST ADULT - NSICDXPASTMEDICALHX_GEN_ALL_CORE_FT
PAST MEDICAL HISTORY:  HLD (hyperlipidemia)     Menopausal and female climacteric states      PAST MEDICAL HISTORY:  HLD (hyperlipidemia)     Thickened endometrium     Uterine polyp

## 2025-06-27 ENCOUNTER — TRANSCRIPTION ENCOUNTER (OUTPATIENT)
Age: 55
End: 2025-06-27

## 2025-06-27 ENCOUNTER — OUTPATIENT (OUTPATIENT)
Dept: OUTPATIENT SERVICES | Facility: HOSPITAL | Age: 55
LOS: 1 days | End: 2025-06-27
Payer: COMMERCIAL

## 2025-06-27 VITALS
RESPIRATION RATE: 16 BRPM | DIASTOLIC BLOOD PRESSURE: 96 MMHG | TEMPERATURE: 97 F | HEIGHT: 62 IN | HEART RATE: 78 BPM | WEIGHT: 138.89 LBS | SYSTOLIC BLOOD PRESSURE: 129 MMHG | OXYGEN SATURATION: 99 %

## 2025-06-27 VITALS
DIASTOLIC BLOOD PRESSURE: 69 MMHG | RESPIRATION RATE: 16 BRPM | TEMPERATURE: 98 F | OXYGEN SATURATION: 98 % | HEART RATE: 54 BPM | SYSTOLIC BLOOD PRESSURE: 114 MMHG

## 2025-06-27 DIAGNOSIS — Z98.890 OTHER SPECIFIED POSTPROCEDURAL STATES: Chronic | ICD-10-CM

## 2025-06-27 DIAGNOSIS — Z98.891 HISTORY OF UTERINE SCAR FROM PREVIOUS SURGERY: Chronic | ICD-10-CM

## 2025-06-27 DIAGNOSIS — N95.1 MENOPAUSAL AND FEMALE CLIMACTERIC STATES: ICD-10-CM

## 2025-06-27 PROCEDURE — 58558 HYSTEROSCOPY BIOPSY: CPT

## 2025-06-27 PROCEDURE — 88305 TISSUE EXAM BY PATHOLOGIST: CPT

## 2025-06-27 PROCEDURE — 88305 TISSUE EXAM BY PATHOLOGIST: CPT | Mod: 26

## 2025-06-27 PROCEDURE — 76998 US GUIDE INTRAOP: CPT

## 2025-06-27 PROCEDURE — C1782: CPT

## 2025-06-27 DEVICE — AVETA SMOL RESECTING DEVICE 2.9MM: Type: IMPLANTABLE DEVICE | Status: FUNCTIONAL

## 2025-06-27 RX ORDER — ESTRADIOL 0.05MG/24H
1 PATCH, TRANSDERMAL SEMIWEEKLY TRANSDERMAL
Refills: 0 | DISCHARGE

## 2025-06-27 RX ORDER — ONDANSETRON HCL/PF 4 MG/2 ML
4 VIAL (ML) INJECTION ONCE
Refills: 0 | Status: COMPLETED | OUTPATIENT
Start: 2025-06-27 | End: 2025-06-27

## 2025-06-27 RX ORDER — FENTANYL CITRATE-0.9 % NACL/PF 100MCG/2ML
50 SYRINGE (ML) INTRAVENOUS
Refills: 0 | Status: DISCONTINUED | OUTPATIENT
Start: 2025-06-27 | End: 2025-06-27

## 2025-06-27 RX ORDER — LIDOCAINE HCL/PF 20 MG/ML
0.2 AMPUL, LUER TIP INJECTION ONCE
Refills: 0 | Status: COMPLETED | OUTPATIENT
Start: 2025-06-27 | End: 2025-06-27

## 2025-06-27 RX ORDER — APREPITANT 40 MG/1
40 CAPSULE ORAL ONCE
Refills: 0 | Status: COMPLETED | OUTPATIENT
Start: 2025-06-27 | End: 2025-06-27

## 2025-06-27 RX ORDER — HYDROMORPHONE/SOD CHLOR,ISO/PF 2 MG/10 ML
0.5 SYRINGE (ML) INJECTION
Refills: 0 | Status: DISCONTINUED | OUTPATIENT
Start: 2025-06-27 | End: 2025-06-27

## 2025-06-27 RX ORDER — METOCLOPRAMIDE HCL 10 MG
10 TABLET ORAL ONCE
Refills: 0 | Status: COMPLETED | OUTPATIENT
Start: 2025-06-27 | End: 2025-06-27

## 2025-06-27 RX ORDER — EVOLOCUMAB 140 MG/ML
140 INJECTION, SOLUTION SUBCUTANEOUS
Refills: 0 | DISCHARGE

## 2025-06-27 RX ORDER — SODIUM CHLORIDE 9 G/1000ML
1000 INJECTION, SOLUTION INTRAVENOUS
Refills: 0 | Status: ACTIVE | OUTPATIENT
Start: 2025-06-27 | End: 2026-05-26

## 2025-06-27 RX ORDER — PROGESTERONE 200 MG/1
2 CAPSULE ORAL
Refills: 0 | DISCHARGE

## 2025-06-27 RX ADMIN — Medication 4 MILLIGRAM(S): at 11:54

## 2025-06-27 RX ADMIN — Medication 10 MILLIGRAM(S): at 12:28

## 2025-06-27 RX ADMIN — Medication 50 MICROGRAM(S): at 11:55

## 2025-06-27 RX ADMIN — APREPITANT 40 MILLIGRAM(S): 40 CAPSULE ORAL at 09:19

## 2025-06-27 RX ADMIN — Medication 0.5 MILLIGRAM(S): at 12:10

## 2025-06-27 RX ADMIN — SODIUM CHLORIDE 100 MILLILITER(S): 9 INJECTION, SOLUTION INTRAVENOUS at 09:19

## 2025-06-27 RX ADMIN — Medication 50 MICROGRAM(S): at 11:40

## 2025-06-27 RX ADMIN — Medication 0.5 MILLIGRAM(S): at 11:54

## 2025-06-27 NOTE — ASU DISCHARGE PLAN (ADULT/PEDIATRIC) - CARE PROVIDER_API CALL
Pradip Muñoz  Obstetrics & Gynecology  [address] [place] [address]  Phone: [phone]  Fax: [phone]  Follow Up Time: 1 month

## 2025-06-27 NOTE — ASU DISCHARGE PLAN (ADULT/PEDIATRIC) - FINANCIAL ASSISTANCE
[facility] provides services at a reduced cost to those who are determined to be eligible through [facility]’s financial assistance program. Information regarding [facility]’s financial assistance program can be found by going to [email or web address] or by calling [phone].

## 2025-06-27 NOTE — ASU PATIENT PROFILE, ADULT - FALL HARM RISK - UNIVERSAL INTERVENTIONS
Bed in lowest position, wheels locked, appropriate side rails in place/Call bell, personal items and telephone in reach/Instruct patient to call for assistance before getting out of bed or chair/Non-slip footwear when patient is out of bed/[place] to call system/Physically safe environment - no spills, clutter or unnecessary equipment/Purposeful Proactive Rounding/Room/bathroom lighting operational, light cord in reach

## 2025-06-27 NOTE — PRE-ANESTHESIA EVALUATION ADULT - NSPROPOSEDPROCEDFT_GEN_ALL_CORE
Dilation and curettage, diagnostic hysteroscopy, possible polypectomy with Aveta, ultrasound guidance

## 2025-06-27 NOTE — ASU PREOP CHECKLIST - WEIGHT IN LBS
138.8 Advancement Flap (Double) Text: The defect edges were debeveled with a #15 scalpel blade.  Given the location of the defect and the proximity to free margins a double advancement flap was deemed most appropriate.  Using a sterile surgical marker, the appropriate advancement flaps were drawn incorporating the defect and placing the expected incisions within the relaxed skin tension lines where possible.    The area thus outlined was incised deep to adipose tissue with a #15 scalpel blade.  The skin margins were undermined to an appropriate distance in all directions utilizing iris scissors.

## 2025-07-01 ENCOUNTER — APPOINTMENT (OUTPATIENT)
Dept: GYNECOLOGIC ONCOLOGY | Facility: CLINIC | Age: 55
End: 2025-07-01

## 2025-07-03 LAB — SURGICAL PATHOLOGY STUDY: SIGNIFICANT CHANGE UP

## 2025-07-23 NOTE — H&P PST ADULT - NSCAFFEINETYPE_GEN_ALL_CORE_SD
PAST SURGICAL HISTORY:  After cataract, bilateral     S/P coronary artery stent placement 2018    
coffee

## 2025-08-22 ENCOUNTER — RX RENEWAL (OUTPATIENT)
Age: 55
End: 2025-08-22

## (undated) DEVICE — VENODYNE/SCD SLEEVE CALF MEDIUM

## (undated) DEVICE — PACK LITHOTOMY

## (undated) DEVICE — AVETA CORAL HYSTEROSCOPE 4.6MM DISP

## (undated) DEVICE — DRAPE LIGHT HANDLE COVER (GREEN)

## (undated) DEVICE — DRAPE 1/2 SHEET 40X57"

## (undated) DEVICE — AVETA FLUID MANAGEMENT ACCESSORY

## (undated) DEVICE — OS FINDER

## (undated) DEVICE — CURETTE ENDOMETRIAL GYNOSAMPLER 23.6CM

## (undated) DEVICE — GLV 7.5 PROTEXIS (WHITE)

## (undated) DEVICE — GOWN TRIMAX LG

## (undated) DEVICE — NSA-VIDEO TOWER - STRYKER: Type: DURABLE MEDICAL EQUIPMENT

## (undated) DEVICE — Device

## (undated) DEVICE — SOL IRR BAG NS 0.9% 3000ML

## (undated) DEVICE — ACCESSORY AVETA WASTE MANAGEMENT 3MM

## (undated) DEVICE — SOL IRR POUR NS 0.9% 500ML

## (undated) DEVICE — FOLEY CATH 2-WAY 16FR 5CC SILICONE

## (undated) DEVICE — PREP BETADINE KIT

## (undated) DEVICE — WARMING BLANKET UPPER ADULT

## (undated) DEVICE — POSITIONER FOAM EGG CRATE ULNAR 2PCS (PINK)